# Patient Record
Sex: FEMALE | Race: BLACK OR AFRICAN AMERICAN | NOT HISPANIC OR LATINO | Employment: OTHER | URBAN - METROPOLITAN AREA
[De-identification: names, ages, dates, MRNs, and addresses within clinical notes are randomized per-mention and may not be internally consistent; named-entity substitution may affect disease eponyms.]

---

## 2023-07-12 ENCOUNTER — OFFICE VISIT (OUTPATIENT)
Dept: PRIMARY CARE | Facility: CLINIC | Age: 78
End: 2023-07-12
Payer: MEDICARE

## 2023-07-12 VITALS
SYSTOLIC BLOOD PRESSURE: 128 MMHG | DIASTOLIC BLOOD PRESSURE: 78 MMHG | HEART RATE: 79 BPM | HEIGHT: 65 IN | WEIGHT: 116 LBS | OXYGEN SATURATION: 97 % | BODY MASS INDEX: 19.33 KG/M2

## 2023-07-12 DIAGNOSIS — Z00.00 MEDICARE ANNUAL WELLNESS VISIT, SUBSEQUENT: Primary | ICD-10-CM

## 2023-07-12 DIAGNOSIS — R73.01 ELEVATED FASTING GLUCOSE: ICD-10-CM

## 2023-07-12 DIAGNOSIS — E78.2 MODERATE MIXED HYPERLIPIDEMIA NOT REQUIRING STATIN THERAPY: ICD-10-CM

## 2023-07-12 DIAGNOSIS — Z11.59 NEED FOR HEPATITIS C SCREENING TEST: ICD-10-CM

## 2023-07-12 DIAGNOSIS — E55.9 VITAMIN D DEFICIENCY: ICD-10-CM

## 2023-07-12 DIAGNOSIS — R94.6 ABNORMAL THYROID EXAM: ICD-10-CM

## 2023-07-12 DIAGNOSIS — I42.2 OTHER HYPERTROPHIC CARDIOMYOPATHY (MULTI): ICD-10-CM

## 2023-07-12 DIAGNOSIS — I10 PRIMARY HYPERTENSION: ICD-10-CM

## 2023-07-12 DIAGNOSIS — Z12.31 ENCOUNTER FOR SCREENING MAMMOGRAM FOR BREAST CANCER: ICD-10-CM

## 2023-07-12 DIAGNOSIS — Z23 ENCOUNTER FOR IMMUNIZATION: ICD-10-CM

## 2023-07-12 PROBLEM — M25.661 STIFFNESS OF RIGHT KNEE, NOT ELSEWHERE CLASSIFIED: Status: ACTIVE | Noted: 2023-07-12

## 2023-07-12 PROBLEM — H17.9 CORNEAL SCAR: Status: ACTIVE | Noted: 2023-07-12

## 2023-07-12 PROBLEM — H90.0 CONDUCTIVE HEARING LOSS OF BOTH EARS: Status: ACTIVE | Noted: 2023-07-12

## 2023-07-12 PROBLEM — E78.5 HYPERLIPIDEMIA: Status: ACTIVE | Noted: 2023-07-12

## 2023-07-12 PROBLEM — D86.9 SARCOIDOSIS: Status: ACTIVE | Noted: 2023-07-12

## 2023-07-12 PROBLEM — D11.0 PAROTID PLEOMORPHIC ADENOMA: Status: ACTIVE | Noted: 2023-07-12

## 2023-07-12 PROBLEM — H04.129 DRY EYE SYNDROME: Status: ACTIVE | Noted: 2023-07-12

## 2023-07-12 PROCEDURE — 99397 PER PM REEVAL EST PAT 65+ YR: CPT | Performed by: FAMILY MEDICINE

## 2023-07-12 PROCEDURE — 1036F TOBACCO NON-USER: CPT | Performed by: FAMILY MEDICINE

## 2023-07-12 PROCEDURE — 1159F MED LIST DOCD IN RCRD: CPT | Performed by: FAMILY MEDICINE

## 2023-07-12 PROCEDURE — 3074F SYST BP LT 130 MM HG: CPT | Performed by: FAMILY MEDICINE

## 2023-07-12 PROCEDURE — 99213 OFFICE O/P EST LOW 20 MIN: CPT | Performed by: FAMILY MEDICINE

## 2023-07-12 PROCEDURE — 1160F RVW MEDS BY RX/DR IN RCRD: CPT | Performed by: FAMILY MEDICINE

## 2023-07-12 PROCEDURE — G0439 PPPS, SUBSEQ VISIT: HCPCS | Performed by: FAMILY MEDICINE

## 2023-07-12 PROCEDURE — 3078F DIAST BP <80 MM HG: CPT | Performed by: FAMILY MEDICINE

## 2023-07-12 PROCEDURE — 1170F FXNL STATUS ASSESSED: CPT | Performed by: FAMILY MEDICINE

## 2023-07-12 RX ORDER — LISINOPRIL 40 MG/1
40 TABLET ORAL DAILY
COMMUNITY
End: 2023-07-12 | Stop reason: SDUPTHER

## 2023-07-12 RX ORDER — ACETAMINOPHEN 325 MG/1
TABLET ORAL
COMMUNITY

## 2023-07-12 RX ORDER — DOXAZOSIN 2 MG/1
2 TABLET ORAL NIGHTLY
Qty: 90 TABLET | Refills: 1 | Status: SHIPPED | OUTPATIENT
Start: 2023-07-12 | End: 2024-01-31 | Stop reason: SDUPTHER

## 2023-07-12 RX ORDER — DOXAZOSIN 2 MG/1
2 TABLET ORAL NIGHTLY
COMMUNITY
End: 2023-07-12 | Stop reason: SDUPTHER

## 2023-07-12 RX ORDER — ASCORBIC ACID 125 MG
TABLET,CHEWABLE ORAL
COMMUNITY
Start: 2020-08-27

## 2023-07-12 RX ORDER — FLAXSEED OIL 1000 MG
1000 CAPSULE ORAL DAILY
COMMUNITY

## 2023-07-12 RX ORDER — LISINOPRIL 40 MG/1
40 TABLET ORAL DAILY
Qty: 90 TABLET | Refills: 1 | Status: SHIPPED | OUTPATIENT
Start: 2023-07-12 | End: 2024-01-31 | Stop reason: SDUPTHER

## 2023-07-12 ASSESSMENT — ENCOUNTER SYMPTOMS
DEPRESSION: 0
LOSS OF SENSATION IN FEET: 0
OCCASIONAL FEELINGS OF UNSTEADINESS: 0

## 2023-07-12 ASSESSMENT — ACTIVITIES OF DAILY LIVING (ADL)
ADEQUATE_TO_COMPLETE_ADL: YES
GROCERY SHOPPING: INDEPENDENT
DOING HOUSEWORK: INDEPENDENT
TOILETING: INDEPENDENT
DRESSING: INDEPENDENT
JUDGMENT_ADEQUATE_SAFELY_COMPLETE_DAILY_ACTIVITIES: YES
MANAGING FINANCES: INDEPENDENT
PREPARING MEALS: INDEPENDENT
NEEDS ASSISTANCE WITH FOOD: INDEPENDENT
PILL BOX USED: YES
FEEDING: INDEPENDENT
USING TELEPHONE: INDEPENDENT
USING TRANSPORTATION: INDEPENDENT
STIL DRIVING: YES
TAKING MEDICATION: INDEPENDENT
EATING: INDEPENDENT
BATHING: INDEPENDENT

## 2023-07-12 ASSESSMENT — COLUMBIA-SUICIDE SEVERITY RATING SCALE - C-SSRS
1. IN THE PAST MONTH, HAVE YOU WISHED YOU WERE DEAD OR WISHED YOU COULD GO TO SLEEP AND NOT WAKE UP?: NO
6. HAVE YOU EVER DONE ANYTHING, STARTED TO DO ANYTHING, OR PREPARED TO DO ANYTHING TO END YOUR LIFE?: NO
2. HAVE YOU ACTUALLY HAD ANY THOUGHTS OF KILLING YOURSELF?: NO

## 2023-07-12 ASSESSMENT — PATIENT HEALTH QUESTIONNAIRE - PHQ9
2. FEELING DOWN, DEPRESSED OR HOPELESS: NOT AT ALL
1. LITTLE INTEREST OR PLEASURE IN DOING THINGS: NOT AT ALL
SUM OF ALL RESPONSES TO PHQ9 QUESTIONS 1 AND 2: 0

## 2023-07-12 NOTE — PROGRESS NOTES
"Subjective   Patient ID: Liudmila Bal is a 77 y.o. female who presents for Medicare Annual Wellness Visit Subsequent.    HPI   The patient reports that she is taking lisinopril and doxazosin for her hypertension as prescribed and is tolerating it well. She is still experiencing knee pain and has gotten rheumatological work-up done which showed no concerns. She is currently treating it with over-the-counter diclofenac gel and this alleviates her pain. Her sarcoidosis and cardiomyopathy are both stable at this time.     Review of Systems  Constitutional: No fever or chills, No Night Sweats  Eyes: No Blurry Vision or Eye sight problems  ENT: No Nasal Discharge, Hoarseness, sore throat  Cardiovascular: no chest pain, no palpitations and no syncope.   Respiratory: no cough, no shortness of breath during exertion and no shortness of breath at rest.   Gastrointestinal: no abdominal pain, no nausea and no vomiting.   : No vaginal discharge, burning with urination, no blood in urine or stools  Skin: No Skin rashes or Lesions  Neuro: No Headache, no dizziness or Numbness or tingling  Psych: No Anxiety, depression or sleeping problems  Heme: No Easy bleeding or brusing.     Objective   /78   Pulse 79   Ht 1.651 m (5' 5\")   Wt 52.6 kg (116 lb)   SpO2 97%   BMI 19.30 kg/m²     Physical Exam  Patient declined Chaperone  Constitutional: Alert and in no acute distress. Well developed, well nourished.   Head and Face: Head and face: Normal.    Eyes: Normal external exam. Pupils were equal in size, round, reactive to light (PERRL) with normal accommodation and extraocular movements intact (EOMI).   Ears, Nose, Mouth, and Throat: External inspection of ears and nose: Normal.  Hearing: Normal.  Nasal mucosa, septum, and turbinates: Normal.  Lips, teeth, and gums: Normal.  Oropharynx: Normal.   Neck: No neck mass was observed. Supple. Thyroid not enlarged and there were no palpable thyroid nodules.   Cardiovascular: " Heart rate and rhythm were normal, normal S1 and S2. Pedal pulses: Normal. No peripheral edema.   Pulmonary: No respiratory distress. Clear bilateral breath sounds.   Breast: Left breast normal, no breast tissue on right breast.   Abdomen: Soft nontender; no abdominal mass palpated. Normal bowel sounds. No organomegaly.   Musculoskeletal: No joint swelling seen, normal movements of all extremities. Range of motion: Normal.  Muscle strength/tone: Normal.    Skin: Normal skin color and pigmentation, normal skin turgor, and no rash.   Neurologic: Deep tendon reflexes were 2+ and symmetric.   Psychiatric: Judgment and insight: Intact. Mood and affect: Normal.  Lymphatic: No cervical lymphadenopathy. Palpation of lymph nodes in axillae: Normal.  Palpation of lymph nodes in groin: Normal.    Lab Results   Component Value Date    WBC 4.6 06/24/2022    HGB 11.7 (L) 06/24/2022    HCT 38.6 06/24/2022     06/24/2022    CHOL 205 (H) 06/24/2022    TRIG 65 06/24/2022    HDL 68.3 06/24/2022    ALT 13 06/24/2022    AST 18 06/24/2022     (H) 02/15/2023    K 4.2 02/15/2023     (H) 02/15/2023    CREATININE 1.03 02/15/2023    BUN 14 02/15/2023    CO2 28 02/15/2023    TSH 1.23 06/24/2022    INR 1.1 11/13/2017    HGBA1C 5.7 (A) 06/24/2022       BI mammo bilateral screening tomosynthesis  Narrative: Interpreted By:  CARY JOHNSON MD  MRN: [id number]  Patient Name: DIONI ALBERTO     STUDY:  DIGITAL MAMM SCREENING W/ NANCY;  5/17/2023 8:25 am     ACCESSION NUMBER(S):  [id number]     ORDERING CLINICIAN:  ERWIN IRWIN     INDICATION:  Screening. Right mastectomy.     COMPARISON:  05/16/2022 and 06/04/2021.     FINDINGS:  2D and tomosynthesis images were reviewed at 1 mm slice thickness.  Best images possible.     The breast tissue is extremely dense, which may limit the sensitivity  of mammography.   No suspicious masses or calcifications are  identified.     Impression: No mammographic evidence of malignancy.     BI-RADS  CATEGORY:     Category: 1 - Negative.  Recommendation: 1 Year Screening.     For any future breast imaging appointments, please call [phone]  [phone]).     Patient letter sent SNORM            Assessment/Plan   Diagnoses and all orders for this visit:  Medicare annual wellness visit, subsequent  Encounter for immunization  -     diphth,pertus,acell,,tetanus (BoostRIX) 2.5-8-5 Lf-mcg-Lf/0.5mL injection; Inject 0.5 mL into the shoulder, thigh, or buttocks 1 time for 1 dose.  -     zoster vaccine-recombinant adjuvanted (Shingrix) 50 mcg/0.5 mL vaccine; Inject 0.5 mL into the shoulder, thigh, or buttocks every 8 (eight) weeks.  Primary hypertension  -     Albumin , Urine Random; Future  -     CBC; Future  -     Comprehensive Metabolic Panel; Future  -     lisinopril 40 mg tablet; Take 1 tablet (40 mg) by mouth once daily. for blood pressure  -     doxazosin (Cardura) 2 mg tablet; Take 1 tablet (2 mg) by mouth once daily at bedtime.  -     Follow Up In [facility] - PCP - Established; Future  Moderate mixed hyperlipidemia not requiring statin therapy  -     Lipid Panel; Future  Elevated fasting glucose  -     Hemoglobin A1C; Future  Vitamin D deficiency  -     Vitamin B12; Future  -     Vitamin D, Total; Future  Encounter for screening mammogram for breast cancer  -     BI mammo bilateral screening tomosynthesis; Future  Need for hepatitis C screening test  -     Hepatitis C antibody; Future  Abnormal thyroid exam  -     TSH with reflex to Free T4 if abnormal; Future  Other hypertrophic cardiomyopathy (CMS/HCC)        Dear Liudmila Bal     It was my pleasure to take care of you today in the office. Below are the things we discussed today:    1. 1. Immunizations: Yearly Flu shot is recommended.          a: COVID: Up-to-Date         b: Tetanus: Please get from the pharmacy         c: Shingrix: Please get from the pharmacy          d: Pneumovax: Up-to-date         e: Prevnar: Up-to-date    2. Blood Work:  Ordered   3. Seen your dentist twice a year  4. Yearly Eye exam is recommended    5. BMI: Normal   6: Diet recommendations:   Eat Clean, Try to have as many home cooked meals as possible  Avoid processed foods which contain excess calories, sugar, and sodium.    7. Exercise recommendations:   150 minutes a week to maintain your weight     If you have to loose weight, you need a better diet and exercise plan.     8. Supplements recommended:  a - Calcium 600 mg up to twice a day to get a total of 1200 mg. Each 8 oz of milk or yogurt or 1 oz of cheese, 1 Banana, 1 serving of green Leafy vegetable has about 300 mg of Calcium, so you may subtract that amount. Calcium citrate is the only acceptable supplement to take if you take an acid suppressing medication like Prilosec; otherwise Calcium carbonate is acceptable too (It can cause Constipation).   b - Vitamin D - 2000 IU daily     9. Please get your Living will / Advance directive completed if you do not have one already. Please make sure our office has a copy of the latest one.     10. Colonoscopy: Uptodate, Last done in July 2022, repeat in July 2025  11. Mammogram: Cologuard Uptodate, ordered for May 2024   12. PAP: Not indicated   13. DEXA: Up-to-date   14: Skin Check: Please see Dermatology once a year for a Skin Check.     15. Hypertension: Continue Lisinopril and doxazosin.    16. Knee pain: Use over-the-counter diclofenac gel.    17. Sarcoidosis: Stable.     18. Cardiomyopathy: Stable.    Follow up in 6 months.    Follow up in one year for a Physical. Please call the office before your Physical to see if you need blood work completed prior to your physical.     Please call me if any questions arise from now until your next visit. I will call you after I am done seeing patients. A Doctor is always available by phone when the office is closed. Please feel free to call for help with any problem that you feel shouldn't wait until the office re-opens.     Scribe  Attestation  By signing my name below, I, Rudolph Mendoza   attest that this documentation has been prepared under the direction and in the presence of Elvi Granados MD.

## 2023-07-20 LAB
ALANINE AMINOTRANSFERASE (SGPT) (U/L) IN SER/PLAS: 12 U/L (ref 7–45)
ALBUMIN (G/DL) IN SER/PLAS: 4.5 G/DL (ref 3.4–5)
ALBUMIN (MG/L) IN URINE: 12.6 MG/L
ALBUMIN/CREATININE (UG/MG) IN URINE: 17.2 UG/MG CRT (ref 0–30)
ALKALINE PHOSPHATASE (U/L) IN SER/PLAS: 75 U/L (ref 33–136)
ANION GAP IN SER/PLAS: 13 MMOL/L (ref 10–20)
ASPARTATE AMINOTRANSFERASE (SGOT) (U/L) IN SER/PLAS: 21 U/L (ref 9–39)
BILIRUBIN TOTAL (MG/DL) IN SER/PLAS: 0.6 MG/DL (ref 0–1.2)
CALCIDIOL (25 OH VITAMIN D3) (NG/ML) IN SER/PLAS: 53 NG/ML
CALCIUM (MG/DL) IN SER/PLAS: 11 MG/DL (ref 8.6–10.6)
CARBON DIOXIDE, TOTAL (MMOL/L) IN SER/PLAS: 31 MMOL/L (ref 21–32)
CHLORIDE (MMOL/L) IN SER/PLAS: 107 MMOL/L (ref 98–107)
CHOLESTEROL (MG/DL) IN SER/PLAS: 204 MG/DL (ref 0–199)
CHOLESTEROL IN HDL (MG/DL) IN SER/PLAS: 77.6 MG/DL
CHOLESTEROL/HDL RATIO: 2.6
COBALAMIN (VITAMIN B12) (PG/ML) IN SER/PLAS: 368 PG/ML (ref 211–911)
CREATININE (MG/DL) IN SER/PLAS: 0.88 MG/DL (ref 0.5–1.05)
CREATININE (MG/DL) IN URINE: 73.1 MG/DL (ref 20–320)
ERYTHROCYTE DISTRIBUTION WIDTH (RATIO) BY AUTOMATED COUNT: 14.1 % (ref 11.5–14.5)
ERYTHROCYTE MEAN CORPUSCULAR HEMOGLOBIN CONCENTRATION (G/DL) BY AUTOMATED: 30.8 G/DL (ref 32–36)
ERYTHROCYTE MEAN CORPUSCULAR VOLUME (FL) BY AUTOMATED COUNT: 92 FL (ref 80–100)
ERYTHROCYTES (10*6/UL) IN BLOOD BY AUTOMATED COUNT: 4.31 X10E12/L (ref 4–5.2)
ESTIMATED AVERAGE GLUCOSE FOR HBA1C: 117 MG/DL
GFR FEMALE: 66 ML/MIN/1.73M2
GLUCOSE (MG/DL) IN SER/PLAS: 97 MG/DL (ref 74–99)
HEMATOCRIT (%) IN BLOOD BY AUTOMATED COUNT: 39.6 % (ref 36–46)
HEMOGLOBIN (G/DL) IN BLOOD: 12.2 G/DL (ref 12–16)
HEMOGLOBIN A1C/HEMOGLOBIN TOTAL IN BLOOD: 5.7 %
HEPATITIS C VIRUS AB PRESENCE IN SERUM: NONREACTIVE
LDL: 110 MG/DL (ref 0–99)
LEUKOCYTES (10*3/UL) IN BLOOD BY AUTOMATED COUNT: 5.7 X10E9/L (ref 4.4–11.3)
NRBC (PER 100 WBCS) BY AUTOMATED COUNT: 0 /100 WBC (ref 0–0)
PLATELETS (10*3/UL) IN BLOOD AUTOMATED COUNT: 325 X10E9/L (ref 150–450)
POTASSIUM (MMOL/L) IN SER/PLAS: 4.2 MMOL/L (ref 3.5–5.3)
PROTEIN TOTAL: 6.8 G/DL (ref 6.4–8.2)
SODIUM (MMOL/L) IN SER/PLAS: 147 MMOL/L (ref 136–145)
THYROTROPIN (MIU/L) IN SER/PLAS BY DETECTION LIMIT <= 0.05 MIU/L: 0.71 MIU/L (ref 0.44–3.98)
TRIGLYCERIDE (MG/DL) IN SER/PLAS: 80 MG/DL (ref 0–149)
UREA NITROGEN (MG/DL) IN SER/PLAS: 13 MG/DL (ref 6–23)
VLDL: 16 MG/DL (ref 0–40)

## 2023-07-20 PROCEDURE — 82306 VITAMIN D 25 HYDROXY: CPT

## 2023-07-20 PROCEDURE — 83036 HEMOGLOBIN GLYCOSYLATED A1C: CPT

## 2023-07-20 PROCEDURE — 84443 ASSAY THYROID STIM HORMONE: CPT

## 2023-07-20 PROCEDURE — 80061 LIPID PANEL: CPT

## 2023-07-20 PROCEDURE — 86803 HEPATITIS C AB TEST: CPT

## 2023-07-20 PROCEDURE — 82607 VITAMIN B-12: CPT

## 2023-07-20 PROCEDURE — 82570 ASSAY OF URINE CREATININE: CPT

## 2023-07-20 PROCEDURE — 80053 COMPREHEN METABOLIC PANEL: CPT

## 2023-07-20 PROCEDURE — 82043 UR ALBUMIN QUANTITATIVE: CPT

## 2023-07-20 PROCEDURE — 85027 COMPLETE CBC AUTOMATED: CPT

## 2023-08-07 LAB
ALBUMIN (G/DL) IN SER/PLAS: 4.5 G/DL (ref 3.4–5)
ANION GAP IN SER/PLAS: 11 MMOL/L (ref 10–20)
CALCIDIOL (25 OH VITAMIN D3) (NG/ML) IN SER/PLAS: 50 NG/ML
CALCIUM (MG/DL) IN SER/PLAS: 11.1 MG/DL (ref 8.6–10.6)
CARBON DIOXIDE, TOTAL (MMOL/L) IN SER/PLAS: 31 MMOL/L (ref 21–32)
CHLORIDE (MMOL/L) IN SER/PLAS: 107 MMOL/L (ref 98–107)
CREATININE (MG/DL) IN SER/PLAS: 0.94 MG/DL (ref 0.5–1.05)
GFR FEMALE: 61 ML/MIN/1.73M2
GLUCOSE (MG/DL) IN SER/PLAS: 109 MG/DL (ref 74–99)
PARATHYRIN INTACT (PG/ML) IN SER/PLAS: 68.2 PG/ML (ref 18.5–88)
PHOSPHATE (MG/DL) IN SER/PLAS: 2.5 MG/DL (ref 2.5–4.9)
POTASSIUM (MMOL/L) IN SER/PLAS: 4.1 MMOL/L (ref 3.5–5.3)
SODIUM (MMOL/L) IN SER/PLAS: 145 MMOL/L (ref 136–145)
UREA NITROGEN (MG/DL) IN SER/PLAS: 13 MG/DL (ref 6–23)

## 2023-10-22 PROBLEM — D49.0 TUMOR OF PAROTID GLAND: Status: ACTIVE | Noted: 2023-10-22

## 2023-10-22 PROBLEM — N95.8 OTHER SPECIFIED MENOPAUSAL AND PERIMENOPAUSAL DISORDERS: Status: ACTIVE | Noted: 2023-10-22

## 2023-10-22 PROBLEM — H90.A31 MIXED CONDUCTIVE AND SENSORINEURAL HEARING LOSS, UNILATERAL, RIGHT EAR WITH RESTRICTED HEARING ON THE CONTRALATERAL SIDE: Status: ACTIVE | Noted: 2023-10-22

## 2023-10-22 PROBLEM — H90.A22 SENSORINEURAL HEARING LOSS (SNHL) OF LEFT EAR WITH RESTRICTED HEARING OF RIGHT EAR: Status: ACTIVE | Noted: 2023-10-22

## 2023-10-22 PROBLEM — H93.13 TINNITUS, BILATERAL: Status: ACTIVE | Noted: 2023-10-22

## 2023-10-22 PROBLEM — M85.80 OSTEOPENIA: Status: ACTIVE | Noted: 2023-10-22

## 2023-10-22 PROBLEM — H72.03 CENTRAL PERFORATION OF TYMPANIC MEMBRANE OF BOTH EARS: Status: ACTIVE | Noted: 2023-10-22

## 2023-10-22 PROBLEM — E83.52 HYPERCALCEMIA: Status: ACTIVE | Noted: 2023-10-22

## 2023-10-22 PROBLEM — K40.90 LEFT INGUINAL HERNIA: Status: ACTIVE | Noted: 2023-10-22

## 2023-10-22 PROBLEM — H91.90 HEARING LOSS: Status: ACTIVE | Noted: 2023-10-22

## 2023-10-24 ENCOUNTER — OFFICE VISIT (OUTPATIENT)
Dept: OTOLARYNGOLOGY | Facility: CLINIC | Age: 78
End: 2023-10-24
Payer: MEDICARE

## 2023-10-24 VITALS — BODY MASS INDEX: 20.83 KG/M2 | HEIGHT: 65 IN | WEIGHT: 125 LBS

## 2023-10-24 DIAGNOSIS — H93.13 TINNITUS, BILATERAL: ICD-10-CM

## 2023-10-24 DIAGNOSIS — H72.92 PERFORATION OF LEFT TYMPANIC MEMBRANE: ICD-10-CM

## 2023-10-24 DIAGNOSIS — H90.A31 MIXED CONDUCTIVE AND SENSORINEURAL HEARING LOSS, UNILATERAL, RIGHT EAR WITH RESTRICTED HEARING ON THE CONTRALATERAL SIDE: ICD-10-CM

## 2023-10-24 DIAGNOSIS — H65.32 CHRONIC MUCOID OTITIS MEDIA OF LEFT EAR: ICD-10-CM

## 2023-10-24 DIAGNOSIS — H90.0 CONDUCTIVE HEARING LOSS OF BOTH EARS: Primary | ICD-10-CM

## 2023-10-24 DIAGNOSIS — H90.A22 SENSORINEURAL HEARING LOSS (SNHL) OF LEFT EAR WITH RESTRICTED HEARING OF RIGHT EAR: ICD-10-CM

## 2023-10-24 PROCEDURE — 1159F MED LIST DOCD IN RCRD: CPT | Performed by: OTOLARYNGOLOGY

## 2023-10-24 PROCEDURE — 1036F TOBACCO NON-USER: CPT | Performed by: OTOLARYNGOLOGY

## 2023-10-24 PROCEDURE — 99213 OFFICE O/P EST LOW 20 MIN: CPT | Performed by: OTOLARYNGOLOGY

## 2023-10-24 PROCEDURE — 1160F RVW MEDS BY RX/DR IN RCRD: CPT | Performed by: OTOLARYNGOLOGY

## 2023-10-24 PROCEDURE — 92504 EAR MICROSCOPY EXAMINATION: CPT | Performed by: OTOLARYNGOLOGY

## 2023-10-24 RX ORDER — OFLOXACIN 3 MG/ML
4 SOLUTION AURICULAR (OTIC) 2 TIMES WEEKLY
Qty: 5 ML | Refills: 1 | Status: SHIPPED | OUTPATIENT
Start: 2023-10-26 | End: 2023-11-05

## 2023-10-24 NOTE — LETTER
October 24, 2023     Elvi Granados MD  [address]  [address]  [facility] [address]    Patient: Liudmila Bal   YOB: 1945   Date of Visit: 10/24/2023       Dear Dr. Elvi Granados MD:    I had the pleasure of seeing your patient, Liudmila Bal today. Below are my notes for this consultation.  If you have questions, please do not hesitate to call me. Thank you for allowing me to participate in the care of your patient.        Sincerely,     Kerri Glez MD      CC: No Recipients  _____________________________________________________________________________    78 y.o. 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 Overall she's doing well, she denies any ear drainage or pain, she does feel her hearing on the right is subjectively down and she continues to have constant right-sided tinnitus on that side. She's having aural fullness on the left, she attributes it to cerumen.     - Offered audiogram, she deferred, can contact office at any time for this order   - Advised to use ear drops in left ear 2x weekly for cerumen, prescription sent today   - RTC in 3 months

## 2023-10-24 NOTE — PATIENT INSTRUCTIONS
Welcome to Dr. Glez's clinic. We are here to assist you through your ENT care at [facility].  Dr. Glez is an Ear surgeon. This means that she specializes in taking care of patients with complex ear problems.     Dr. Glez's office number is [phone]. While you may see her at a satellite office, she has a team committed to help meet your healthcare needs at [facility]'s [facility]. This number is the most direct way to communicate with the office.     Mary is Dr. Glez's  and she answers the office phone from 8am-4pm Mon-Fri. She can help you with many general questions and information. Questions that she cannot answer will be directed to the appropriate staff. You may need to leave a message. In this case, someone from the team will call you back.     Hector is Dr. Glez's primary nurse and can be reached by calling the office. Hector is in clinic with Dr. Glez's on Mondays and Tuesdays. Non-urgent calls will be returned on non-clinic days typically Thursdays.     Sometimes, other team members will also be involved in your care. These people may include dieticians, social workers, speech therapists, audiologist, neurologist, and physical therapist. Dr. Glez will provide these referrals as needed. Please let her know if you would like to request a specific referral.     For your convenience, Dr. Glez sees patients at several [facility] locations including [facility] and [facility] at the main campus of [facility]. While we try to make your appointments as convenient as possible, occasionally a visit to another location may be necessary to provide the best care for you. We look forward to working with you to meet your healthcare goals.     Dr. Glez makes every effort to run on time for your appointments. Therefore, if you are more than 30 minutes late unrelated to a scan or another appointment such therapy or audio you will have to reschedule.

## 2023-10-24 NOTE — PROGRESS NOTES
History Of Present Illness:  Liudmila Bal is a 78 y.o. 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 She's doing well overall, she denies any ear drainage or pain. She does feel her hearing on the right is subjectively down. She continues to have constant right-sided tinnitus. She's having aural fullness on the left, she attributes it to cerumen.     Recall 4/25/23:   Liudmila is a 76 yo female w/ a hx of mixed conductive and sensorineural hearing loss and bilateral TM perforations, she is s/p R CWU tympanomastoidectomy on 11/20/17. She continues to have constant right-sided tinnitus. She's recently had some fullness in her left ear, she denies any drainage from that side.     Surgical History:  She has a past surgical history that includes Lymph node biopsy (07/06/2012); Tubal ligation (07/06/2012); Lymph node biopsy (07/06/2012); Other surgical history (07/06/2012); Cervical laminectomy (07/06/2012); Other surgical history (07/06/2012); [place] lymph node biopsy (03/18/2014); Other surgical history (08/05/2019); Other surgical history (04/16/2014); Other surgical history (04/16/2014); Other surgical history (06/10/2014); Other surgical history (05/14/2019); and Cataract extraction (04/01/2013).     Allergies:  Simvastatin, Tamoxifen, and Fluticasone    Medications:   Current Outpatient Medications   Medication Instructions    acetaminophen (Tylenol) 325 mg tablet oral    cholecalciferol, vitamin D3, 25 mcg (1,000 unit) tablet,chewable oral    doxazosin (CARDURA) 2 mg, oral, Nightly    flaxseed oiL 1,000 mg, oral, Daily    lisinopril 40 mg, oral, Daily, for blood pressure    white petrolatum-mineral oiL 94-3 % ophthalmic ointment ophthalmic (eye)    zoster vaccine-recombinant adjuvanted (Shingrix) 50 mcg/0.5 mL vaccine 0.5 mL, intramuscular, Every 8 weeks      Review of Systems:   A comprehensive  10-point review of systems was obtained including constitutional, neurological, HEENT, pulmonary, cardiovascular, genito-urinary, and other pertinent systems and was negative except as noted in the HPI.      Physical Exam:  Constitutional   General appearance: Healthy-appearing, well-nourished, well groomed, in no acute distress.   Ability to communicate: Normal communication without aids, normal voice quality.       Head and face: Atraumatic with no masses, lesions, or scarring.   Facial strength: Normal strength and symmetry, no synkinesis or facial tic.     Ears  Otoscopic examination:   Right: TM intact, mild retraction, effusion on that side  Left: 40% perforation present with thick gluey mucoid effusion, mucosa healthy      Nose: Dorsum symmetric with no visible or palpable deformities.     Oral Cavity/Mouth  Lips, teeth, and gums: Normal lips, gums, and dentition.     Oropharynx: Mucosa moist, no lesions.     Neck: Symmetrical, trachea midline. No masses visible.     Neurological/Psychiatric  Cranial Nerve Examination: II - XII grossly intact.  Orientation to person, place, and time: Normal.  Mood and affect: Normal.     Skin: Normal without rashes or lesions.     Pulmonary  Respiratory effort: Chest expands symmetrically.     Cardiovascular: Good peripheral pulses  Peripheral vascular system: No varicosities, carotid pulse normal, no edema. No jugular venous distension.     Extremities: Appearance of extremities: Normal. Gait normal.     Last Recorded Vitals:  There were no vitals taken for this visit.    Assessment/Plan   78 y.o. female with a history of mixed conductive and sensorineural hearing loss and bilateral TM perforations, she is s/p R U tympanomastoidectomy on 11/20/17. Overall she's doing well, she denies any ear drainage or pain, she does feel her hearing on the right is subjectively down and she continues to have constant right-sided tinnitus on that side. She's having aural fullness on the  left, she attributes it to cerumen.     - Offered audiogram, she deferred, can contact office at any time for this order   - Advised to use ear drops in left ear 2x weekly for cerumen, prescription sent today   - RTC in 3 months      Scribe Attestation:  By signing my name below, I, Ashley Charisse , Scribuche   attest that this documentation has been prepared under the direction and in the presence of Kerri Glez MD.    I have reviewed the documentation as scribed by Ashley Mcqueen and agree with the notes.   Kerri Glez MD

## 2024-01-17 ENCOUNTER — APPOINTMENT (OUTPATIENT)
Dept: PRIMARY CARE | Facility: CLINIC | Age: 79
End: 2024-01-17
Payer: MEDICARE

## 2024-01-30 ENCOUNTER — OFFICE VISIT (OUTPATIENT)
Dept: OTOLARYNGOLOGY | Facility: CLINIC | Age: 79
End: 2024-01-30
Payer: MEDICARE

## 2024-01-30 VITALS — WEIGHT: 125 LBS | BODY MASS INDEX: 20.83 KG/M2 | HEIGHT: 65 IN

## 2024-01-30 DIAGNOSIS — H93.8X2 EAR FULLNESS, LEFT: ICD-10-CM

## 2024-01-30 DIAGNOSIS — H93.13 TINNITUS, BILATERAL: ICD-10-CM

## 2024-01-30 DIAGNOSIS — H90.A22 SENSORINEURAL HEARING LOSS (SNHL) OF LEFT EAR WITH RESTRICTED HEARING OF RIGHT EAR: ICD-10-CM

## 2024-01-30 DIAGNOSIS — H72.92 PERFORATION OF LEFT TYMPANIC MEMBRANE: ICD-10-CM

## 2024-01-30 DIAGNOSIS — H90.A31 MIXED CONDUCTIVE AND SENSORINEURAL HEARING LOSS, UNILATERAL, RIGHT EAR WITH RESTRICTED HEARING ON THE CONTRALATERAL SIDE: Primary | ICD-10-CM

## 2024-01-30 PROCEDURE — 1036F TOBACCO NON-USER: CPT | Performed by: OTOLARYNGOLOGY

## 2024-01-30 PROCEDURE — 99213 OFFICE O/P EST LOW 20 MIN: CPT | Performed by: OTOLARYNGOLOGY

## 2024-01-30 PROCEDURE — 1160F RVW MEDS BY RX/DR IN RCRD: CPT | Performed by: OTOLARYNGOLOGY

## 2024-01-30 PROCEDURE — 92504 EAR MICROSCOPY EXAMINATION: CPT | Performed by: OTOLARYNGOLOGY

## 2024-01-30 PROCEDURE — 1159F MED LIST DOCD IN RCRD: CPT | Performed by: OTOLARYNGOLOGY

## 2024-01-30 ASSESSMENT — PATIENT HEALTH QUESTIONNAIRE - PHQ9
SUM OF ALL RESPONSES TO PHQ9 QUESTIONS 1 AND 2: 0
1. LITTLE INTEREST OR PLEASURE IN DOING THINGS: NOT AT ALL
2. FEELING DOWN, DEPRESSED OR HOPELESS: NOT AT ALL

## 2024-01-30 NOTE — PROGRESS NOTES
History Of Present Illness:  Liudmila Bal is a 78 y.o. 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 She's having left-sided ear fullness, denies any pain or drainage on that side. She's used drops in the past but she's unsure if they were helpful.     Recall 10/24/23:  Liudmila Bal is a 78 y.o. 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 She's doing well overall, she denies any ear drainage or pain. She does feel her hearing on the right is subjectively down. She continues to have constant right-sided tinnitus. She's having aural fullness on the left, she attributes it to cerumen.      Surgical History:  She has a past surgical history that includes Lymph node biopsy (07/06/2012); Tubal ligation (07/06/2012); Lymph node biopsy (07/06/2012); Other surgical history (07/06/2012); Cervical laminectomy (07/06/2012); Other surgical history (07/06/2012); [place] lymph node biopsy (03/18/2014); Other surgical history (08/05/2019); Other surgical history (04/16/2014); Other surgical history (04/16/2014); Other surgical history (06/10/2014); Other surgical history (05/14/2019); and Cataract extraction (04/01/2013).     Allergies:  Simvastatin, Tamoxifen, and Fluticasone    Medications:   Current Outpatient Medications   Medication Instructions    acetaminophen (Tylenol) 325 mg tablet oral    cholecalciferol, vitamin D3, 25 mcg (1,000 unit) tablet,chewable oral    doxazosin (CARDURA) 2 mg, oral, Nightly    flaxseed oiL 1,000 mg, oral, Daily    lisinopril 40 mg, oral, Daily, for blood pressure    white petrolatum-mineral oiL 94-3 % ophthalmic ointment ophthalmic (eye)    zoster vaccine-recombinant adjuvanted (Shingrix) 50 mcg/0.5 mL vaccine 0.5 mL, intramuscular, Every 8 weeks      Review of  "Systems:   A comprehensive 10-point review of systems was obtained including constitutional, neurological, HEENT, pulmonary, cardiovascular, genito-urinary, and other pertinent systems and was negative except as noted in the HPI.      Physical Exam:  Constitutional   General appearance: Healthy-appearing, well-nourished, well groomed, in no acute distress.   Ability to communicate: Normal communication without aids, normal voice quality.       Head and face: Atraumatic with no masses, lesions, or scarring.   Facial strength: Normal strength and symmetry, no synkinesis or facial tic.     Ears  Otoscopic examination:   Right: Ear canal clear, TM normal, no effusion or retraction  Left: TM perforation ~40-50%, thick glue ear mucous suctioned through perforation, middle ear mucosa does not appear particularly inflamed or edematous      Nose: Dorsum symmetric with no visible or palpable deformities.     Oral Cavity/Mouth  Lips, teeth, and gums: Normal lips, gums, and dentition.     Oropharynx: Mucosa moist, no lesions.     Neck: Symmetrical, trachea midline. No masses visible.     Neurological/Psychiatric  Cranial Nerve Examination: II - XII grossly intact.  Orientation to person, place, and time: Normal.  Mood and affect: Normal.     Skin: Normal without rashes or lesions.     Pulmonary  Respiratory effort: Chest expands symmetrically.     Cardiovascular: Good peripheral pulses  Peripheral vascular system: No varicosities, carotid pulse normal, no edema. No jugular venous distension.     Extremities: Appearance of extremities: Normal. Gait normal.     Last Recorded Vitals:  Height 1.651 m (5' 5\"), weight 56.7 kg (125 lb).     Assessment/Plan   78 y.o. female with a history of mixed conductive and sensorineural hearing loss and bilateral TM perforations, she is s/p R U tympanomastoidectomy on 11/20/17. She's had some recent left-sided ear fullness, she denies drainage or pain on that side. We discussed left-sided ear " drops versus saline irrigations to the ear, would suggest saline irrigations due to consistency of middle ear debris, almost glue-like.     - Prescribed sterile saline, should use it 2-3 times weekly in her left ear via bulb syringe, should make sure solution is body temperature as it can cause vertigo   - RTC in 6 weeks      Scribe Attestation:  By signing my name below, I, Ashley Mcqueen , Scribe   attest that this documentation has been prepared under the direction and in the presence of Kerri Glez MD.    I have reviewed the documentation as scribed by Ashley Mcqueen and agree with the notes.   Kerri Glez MD

## 2024-01-30 NOTE — PATIENT INSTRUCTIONS
Welcome to Dr. Glez's clinic. We are here to assist you through your ENT care at [facility].  Dr. Glez is an Ear surgeon. This means that she specializes in taking care of patients with complex ear problems.     Dr. Glez's office number is [phone]. While you may see her at a satellite office, she has a team committed to help meet your healthcare needs at [facility]'s [facility]. This number is the most direct way to communicate with the office.     Mary is Dr. Glez's  and she answers the office phone from 8am-4pm Mon-Fri. She can help you with many general questions and information. Questions that she cannot answer will be directed to the appropriate staff. You may need to leave a message. In this case, someone from the team will call you back.     Hector is Dr. Glez's primary nurse and can be reached by calling the office. Hector is in clinic with Dr. Glez's on Mondays and Tuesdays. Non-urgent calls will be returned on non-clinic days typically Thursdays.     Sometimes, other team members will also be involved in your care. These people may include dieticians, social workers, speech therapists, audiologist, neurologist, and physical therapist. Dr. Glez will provide these referrals as needed. Please let her know if you would like to request a specific referral.     For your convenience, Dr. Glez sees patients at several [facility] locations including [facility] and [facility] at the main campus of [facility]. While we try to make your appointments as convenient as possible, occasionally a visit to another location may be necessary to provide the best care for you. We look forward to working with you to meet your healthcare goals.     Dr. Glez makes every effort to run on time for your appointments. Therefore, if you are more than 30 minutes late unrelated to a scan or another appointment such therapy or audio you will have to reschedule.

## 2024-01-30 NOTE — LETTER
January 30, 2024     Elvi Granados MD  [address]  [address]  [facility] [address]    Patient: Liudmila Bal   YOB: 1945   Date of Visit: 1/30/2024       Dear Dr. Elvi Granados MD:    I had the pleasure of seeing your patient, Liudmila Bal today. Below are my notes for this consultation.  If you have questions, please do not hesitate to call me. Thank you for allowing me to participate in the care of your patient.        Sincerely,     Kerri Glez MD      CC: No Recipients  _____________________________________________________________________________    78 y.o. 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 She's had some recent left-sided ear fullness, she denies drainage or pain on that side. We discussed left-sided ear drops versus saline irrigations to the ear, would suggest saline irrigations due to consistency of middle ear debris, almost glue-like.     - Prescribed sterile saline, should use it 2-3 times weekly in her left ear via bulb syringe, should make sure solution is body temperature as it can cause vertigo   - RTC in 6 weeks

## 2024-01-31 ENCOUNTER — HOSPITAL ENCOUNTER (OUTPATIENT)
Dept: RADIOLOGY | Facility: CLINIC | Age: 79
Discharge: HOME | End: 2024-01-31
Payer: MEDICARE

## 2024-01-31 ENCOUNTER — OFFICE VISIT (OUTPATIENT)
Dept: PRIMARY CARE | Facility: CLINIC | Age: 79
End: 2024-01-31
Payer: MEDICARE

## 2024-01-31 VITALS
HEIGHT: 65 IN | DIASTOLIC BLOOD PRESSURE: 67 MMHG | BODY MASS INDEX: 19.66 KG/M2 | OXYGEN SATURATION: 97 % | HEART RATE: 75 BPM | WEIGHT: 118 LBS | SYSTOLIC BLOOD PRESSURE: 130 MMHG

## 2024-01-31 DIAGNOSIS — I42.2 OTHER HYPERTROPHIC CARDIOMYOPATHY (MULTI): Primary | ICD-10-CM

## 2024-01-31 DIAGNOSIS — G89.29 CHRONIC PAIN OF RIGHT KNEE: ICD-10-CM

## 2024-01-31 DIAGNOSIS — R94.6 ABNORMAL THYROID EXAM: ICD-10-CM

## 2024-01-31 DIAGNOSIS — I10 PRIMARY HYPERTENSION: ICD-10-CM

## 2024-01-31 DIAGNOSIS — R73.9 ELEVATED BLOOD SUGAR: ICD-10-CM

## 2024-01-31 DIAGNOSIS — D49.0 TUMOR OF PAROTID GLAND: ICD-10-CM

## 2024-01-31 DIAGNOSIS — M25.561 CHRONIC PAIN OF RIGHT KNEE: ICD-10-CM

## 2024-01-31 DIAGNOSIS — E55.9 VITAMIN D DEFICIENCY: ICD-10-CM

## 2024-01-31 PROCEDURE — 3078F DIAST BP <80 MM HG: CPT | Performed by: FAMILY MEDICINE

## 2024-01-31 PROCEDURE — 99214 OFFICE O/P EST MOD 30 MIN: CPT | Performed by: FAMILY MEDICINE

## 2024-01-31 PROCEDURE — 1160F RVW MEDS BY RX/DR IN RCRD: CPT | Performed by: FAMILY MEDICINE

## 2024-01-31 PROCEDURE — 1036F TOBACCO NON-USER: CPT | Performed by: FAMILY MEDICINE

## 2024-01-31 PROCEDURE — G2211 COMPLEX E/M VISIT ADD ON: HCPCS | Performed by: FAMILY MEDICINE

## 2024-01-31 PROCEDURE — 73562 X-RAY EXAM OF KNEE 3: CPT | Mod: RIGHT SIDE | Performed by: RADIOLOGY

## 2024-01-31 PROCEDURE — 3075F SYST BP GE 130 - 139MM HG: CPT | Performed by: FAMILY MEDICINE

## 2024-01-31 PROCEDURE — 73562 X-RAY EXAM OF KNEE 3: CPT | Mod: RT

## 2024-01-31 PROCEDURE — 1159F MED LIST DOCD IN RCRD: CPT | Performed by: FAMILY MEDICINE

## 2024-01-31 RX ORDER — DOXAZOSIN 2 MG/1
2 TABLET ORAL NIGHTLY
Qty: 90 TABLET | Refills: 1 | Status: SHIPPED | OUTPATIENT
Start: 2024-01-31

## 2024-01-31 RX ORDER — LISINOPRIL 40 MG/1
40 TABLET ORAL DAILY
Qty: 90 TABLET | Refills: 1 | Status: SHIPPED | OUTPATIENT
Start: 2024-01-31

## 2024-01-31 ASSESSMENT — PATIENT HEALTH QUESTIONNAIRE - PHQ9
2. FEELING DOWN, DEPRESSED OR HOPELESS: NOT AT ALL
SUM OF ALL RESPONSES TO PHQ9 QUESTIONS 1 AND 2: 0
1. LITTLE INTEREST OR PLEASURE IN DOING THINGS: NOT AT ALL

## 2024-03-12 ENCOUNTER — APPOINTMENT (OUTPATIENT)
Dept: OTOLARYNGOLOGY | Facility: CLINIC | Age: 79
End: 2024-03-12
Payer: MEDICARE

## 2024-05-07 ENCOUNTER — APPOINTMENT (OUTPATIENT)
Dept: OTOLARYNGOLOGY | Facility: CLINIC | Age: 79
End: 2024-05-07
Payer: MEDICARE

## 2024-07-03 ENCOUNTER — LAB (OUTPATIENT)
Dept: LAB | Facility: LAB | Age: 79
End: 2024-07-03
Payer: MEDICARE

## 2024-07-03 DIAGNOSIS — E55.9 VITAMIN D DEFICIENCY: ICD-10-CM

## 2024-07-03 DIAGNOSIS — I10 PRIMARY HYPERTENSION: ICD-10-CM

## 2024-07-03 DIAGNOSIS — R73.9 ELEVATED BLOOD SUGAR: ICD-10-CM

## 2024-07-03 DIAGNOSIS — R94.6 ABNORMAL THYROID EXAM: ICD-10-CM

## 2024-07-03 LAB
25(OH)D3 SERPL-MCNC: 52 NG/ML (ref 30–100)
ALBUMIN SERPL BCP-MCNC: 4.3 G/DL (ref 3.4–5)
ALP SERPL-CCNC: 62 U/L (ref 33–136)
ALT SERPL W P-5'-P-CCNC: 12 U/L (ref 7–45)
ANION GAP SERPL CALC-SCNC: 13 MMOL/L (ref 10–20)
AST SERPL W P-5'-P-CCNC: 18 U/L (ref 9–39)
BILIRUB SERPL-MCNC: 0.4 MG/DL (ref 0–1.2)
BUN SERPL-MCNC: 17 MG/DL (ref 6–23)
CALCIUM SERPL-MCNC: 10.8 MG/DL (ref 8.6–10.6)
CHLORIDE SERPL-SCNC: 107 MMOL/L (ref 98–107)
CHOLEST SERPL-MCNC: 214 MG/DL (ref 0–199)
CHOLESTEROL/HDL RATIO: 3.2
CO2 SERPL-SCNC: 30 MMOL/L (ref 21–32)
CREAT SERPL-MCNC: 0.94 MG/DL (ref 0.5–1.05)
CREAT UR-MCNC: 137.4 MG/DL (ref 20–320)
EGFRCR SERPLBLD CKD-EPI 2021: 61 ML/MIN/1.73M*2
ERYTHROCYTE [DISTWIDTH] IN BLOOD BY AUTOMATED COUNT: 14.6 % (ref 11.5–14.5)
EST. AVERAGE GLUCOSE BLD GHB EST-MCNC: 117 MG/DL
GLUCOSE SERPL-MCNC: 114 MG/DL (ref 74–99)
HBA1C MFR BLD: 5.7 %
HCT VFR BLD AUTO: 37.9 % (ref 36–46)
HDLC SERPL-MCNC: 67.3 MG/DL
HGB BLD-MCNC: 11.9 G/DL (ref 12–16)
LDLC SERPL CALC-MCNC: 129 MG/DL
MCH RBC QN AUTO: 28.6 PG (ref 26–34)
MCHC RBC AUTO-ENTMCNC: 31.4 G/DL (ref 32–36)
MCV RBC AUTO: 91 FL (ref 80–100)
MICROALBUMIN UR-MCNC: 11.9 MG/L
MICROALBUMIN/CREAT UR: 8.7 UG/MG CREAT
NON HDL CHOLESTEROL: 147 MG/DL (ref 0–149)
NRBC BLD-RTO: 0 /100 WBCS (ref 0–0)
PLATELET # BLD AUTO: 291 X10*3/UL (ref 150–450)
POTASSIUM SERPL-SCNC: 4.7 MMOL/L (ref 3.5–5.3)
PROT SERPL-MCNC: 6.3 G/DL (ref 6.4–8.2)
RBC # BLD AUTO: 4.16 X10*6/UL (ref 4–5.2)
SODIUM SERPL-SCNC: 145 MMOL/L (ref 136–145)
TRIGL SERPL-MCNC: 90 MG/DL (ref 0–149)
TSH SERPL-ACNC: 1.01 MIU/L (ref 0.44–3.98)
VIT B12 SERPL-MCNC: 304 PG/ML (ref 211–911)
VLDL: 18 MG/DL (ref 0–40)
WBC # BLD AUTO: 5.4 X10*3/UL (ref 4.4–11.3)

## 2024-07-03 PROCEDURE — 85027 COMPLETE CBC AUTOMATED: CPT

## 2024-07-03 PROCEDURE — 80061 LIPID PANEL: CPT

## 2024-07-03 PROCEDURE — 82306 VITAMIN D 25 HYDROXY: CPT

## 2024-07-03 PROCEDURE — 36415 COLL VENOUS BLD VENIPUNCTURE: CPT

## 2024-07-03 PROCEDURE — 80053 COMPREHEN METABOLIC PANEL: CPT

## 2024-07-03 PROCEDURE — 82570 ASSAY OF URINE CREATININE: CPT

## 2024-07-03 PROCEDURE — 84443 ASSAY THYROID STIM HORMONE: CPT

## 2024-07-03 PROCEDURE — 82043 UR ALBUMIN QUANTITATIVE: CPT

## 2024-07-03 PROCEDURE — 83036 HEMOGLOBIN GLYCOSYLATED A1C: CPT

## 2024-07-03 PROCEDURE — 82607 VITAMIN B-12: CPT

## 2024-07-23 ENCOUNTER — APPOINTMENT (OUTPATIENT)
Dept: OTOLARYNGOLOGY | Facility: CLINIC | Age: 79
End: 2024-07-23
Payer: MEDICARE

## 2024-07-23 NOTE — PROGRESS NOTES
"Subjective   Patient ID: Liudmila Bal is a 78 y.o. female who presents for Medicare Annual Wellness Visit Subsequent.      HPI   The patient reports that she is taking lisinopril and doxazosin for hypertension as prescribed and is tolerating it them well. Her cardiomyopathy is stable on the current dose of lisinopril. She complains of chronic right knee pain and has had an x-ray done.    Review of Systems  Constitutional: No fever or chills, No Night Sweats  Eyes: No Blurry Vision or Eye sight problems  ENT: No Nasal Discharge, Hoarseness, sore throat  Cardiovascular: no chest pain, no palpitations and no syncope.   Respiratory: no cough, no shortness of breath during exertion and no shortness of breath at rest.   Gastrointestinal: no abdominal pain, no nausea and no vomiting.   : No vaginal discharge, burning with urination, no blood in urine or stools  Skin: No Skin rashes or Lesions  Neuro: No Headache, no dizziness or Numbness or tingling  Psych: No Anxiety, depression or sleeping problems  Heme: No Easy bleeding or brusing.   MSK: + right knee pain    Objective   /67   Pulse 90   Ht 1.626 m (5' 4\")   Wt 55.3 kg (122 lb)   SpO2 94%   BMI 20.94 kg/m²     Physical Exam  Constitutional: Alert and in no acute distress. Well developed, well nourished.   Head and Face: Head and face: Normal.    Eyes: Normal external exam. Pupils were equal in size, round, reactive to light (PERRL) with normal accommodation and extraocular movements intact (EOMI).   Ears, Nose, Mouth, and Throat: External inspection of ears and nose: Normal.  Hearing: Normal.  Nasal mucosa, septum, and turbinates: Normal.  Lips, teeth, and gums: Normal.  Oropharynx: Normal.   Neck: No neck mass was observed. Supple. Thyroid not enlarged and there were no palpable thyroid nodules.   Cardiovascular: Heart rate and rhythm were normal, normal S1 and S2. Pedal pulses: Normal. No peripheral edema.   Pulmonary: No respiratory distress. " Clear bilateral breath sounds.   Abdomen: Soft nontender; no abdominal mass palpated. Normal bowel sounds. No organomegaly.   Musculoskeletal: No joint swelling seen, normal movements of all extremities. Range of motion: Normal.  Muscle strength/tone: Normal.    Skin: Normal skin color and pigmentation, normal skin turgor, and no rash.   Neurologic: Deep tendon reflexes were 2+ and symmetric.   Psychiatric: Judgment and insight: Intact. Mood and affect: Normal.  Lymphatic: No cervical lymphadenopathy. Palpation of lymph nodes in axillae: Normal.  Palpation of lymph nodes in groin: Normal.    Lab Results   Component Value Date    WBC 5.4 07/03/2024    HGB 11.9 (L) 07/03/2024    HCT 37.9 07/03/2024     07/03/2024    CHOL 214 (H) 07/03/2024    TRIG 90 07/03/2024    HDL 67.3 07/03/2024    ALT 12 07/03/2024    AST 18 07/03/2024     07/03/2024    K 4.7 07/03/2024     07/03/2024    CREATININE 0.94 07/03/2024    BUN 17 07/03/2024    CO2 30 07/03/2024    TSH 1.01 07/03/2024    INR 1.1 11/13/2017    HGBA1C 5.7 (H) 07/03/2024       XR knee right 3 views  Narrative: Interpreted By:  Tye Kate,   STUDY:  XR KNEE RIGHT 3 VIEWS; ;  1/31/2024 11:46 am      INDICATION:  Signs/Symptoms:right knee stiffness.      COMPARISON:  01/31/2024      ACCESSION NUMBER(S):  [id number]      ORDERING CLINICIAN:  ERWIN IRWIN      FINDINGS:  Right knee, three views      Linear sclerosis in the distal femoral metadiaphysis is nonspecific  and may represent sequelae of prior injury or prior intervention. No  meniscal tear is seen however.      There is no fracture dislocation. There is no effusion. There is mild  chondrocalcinosis. No significant degenerative changes seen.      Impression: Mild chondrocalcinosis which can be seen with CPPD arthropathy. No  degenerative changes seen..          MACRO:  None      Signed by: Tye Ktae 2/1/2024 6:45 PM  Dictation workstation:   [id number]      Assessment/Plan    Diagnoses and all orders for this visit:  Medicare annual wellness visit, subsequent  Other hypertrophic cardiomyopathy (Multi)  -     Follow Up In [facility] - PCP - Medicare Annual  Primary hypertension  -     Follow Up In [facility] - PCP - Established; Future  -     Renal Function Panel; Future  -     doxazosin (Cardura) 2 mg tablet; Take 1 tablet (2 mg) by mouth once daily at bedtime.  -     lisinopril 40 mg tablet; Take 1 tablet (40 mg) by mouth once daily. for blood pressure  Asymptomatic menopausal state  -     XR DEXA bone density; Future  Encounter for screening mammogram for breast cancer  -     BI mammo bilateral screening tomosynthesis; Future  Pseudogout of knee, right  -     colchicine 0.6 mg tablet; Take 1 tablet (0.6 mg) by mouth once daily.        Dear Liudmila Bal     It was my pleasure to take care of you today in the office. Below are the things we discussed today:    1. 1. Immunizations: Yearly Flu shot is recommended.          a: COVID: Please get new booster from the pharmacy in fall          b: Tetanus: Up-to-date. Done in 2023          c: Shingrix: 1st shot up-to-date. Please get 2nd shot from the pharmacy          d: Pneumovax: Up-to-date         e: Prevnar: Up-to-date         F. RSV: Please get from the pharmacy in fall     2. Blood Work: Reviewed   3. Seen your dentist twice a year  4. Yearly Eye exam is recommended    5. BMI: Normal   6: Diet recommendations:   Eat Clean, Try to have as many home cooked meals as possible  Avoid processed foods which contain excess calories, sugar, and sodium.    7. Exercise recommendations:   150 minutes a week to maintain your weight     If you have to loose weight, you need a better diet and exercise plan.     8. Supplements recommended:  a - Calcium 600 mg up to twice a day to get a total of 1200 mg. Each 8 oz of milk or yogurt or 1 oz of cheese, 1 Banana, 1 serving of green Leafy vegetable has about 300 mg of Calcium,  so you may subtract that amount. Calcium citrate is the only acceptable supplement to take if you take an acid suppressing medication like Prilosec; otherwise Calcium carbonate is acceptable too (It can cause Constipation).   b - Vitamin D - 2000 IU daily     9. Please get your Living will / Advance directive completed if you do not have one already. Please make sure our office has a copy of the latest one.     10. Colonoscopy: Uptodate. Done in July 2022, repeat in July 2025   11. Mammogram: Ordered   12. PAP: Not indicated   13. DEXA: Ordered   14: Skin Check: Please see Dermatology once a year for a Skin Check.     15. Hypertension: Continue doxazosin and lisinopril.     16. Cardiomyopathy: Stable. Continue lisinopril.     17. Protein calorie malnutrition: Encouraged her to incorporate more protein into her diet.    18. Vitamin B-12 deficiency: Take over-the-counter 500 mcg vitamin B-12.     19. Hyperparathyroidism: The patient is following up with Endocrinology.     20. Pseudogout of the right knee: Start colchicine. Advised her to take every other day or stop taking it and let me know if she experiences any side effects from it. She will see ortho for an injection if there is no improvement after taking these medication.    21. Cerumen impaction: Cerumen successfully removed.    Follow up in 6 months. Please get blood work done prior to your appointment.     Follow up in one year for a Physical. Please call the office before your Physical to see if you need blood work completed prior to your physical.     Please call me if any questions arise from now until your next visit. I will call you after I am done seeing patients. A Doctor is always available by phone when the office is closed. Please feel free to call for help with any problem that you feel shouldn't wait until the office re-opens.     Scribe Attestation  By signing my name below, IDolly Scribe   attest that this documentation has been  prepared under the direction and in the presence of Elvi Granados MD.

## 2024-07-24 DIAGNOSIS — I10 PRIMARY HYPERTENSION: ICD-10-CM

## 2024-07-24 RX ORDER — DOXAZOSIN 2 MG/1
2 TABLET ORAL NIGHTLY
Qty: 90 TABLET | Refills: 1 | OUTPATIENT
Start: 2024-07-24

## 2024-07-26 ENCOUNTER — APPOINTMENT (OUTPATIENT)
Dept: PRIMARY CARE | Facility: CLINIC | Age: 79
End: 2024-07-26
Payer: MEDICARE

## 2024-07-26 VITALS
HEIGHT: 64 IN | DIASTOLIC BLOOD PRESSURE: 67 MMHG | BODY MASS INDEX: 20.83 KG/M2 | OXYGEN SATURATION: 94 % | SYSTOLIC BLOOD PRESSURE: 132 MMHG | WEIGHT: 122 LBS | HEART RATE: 90 BPM

## 2024-07-26 DIAGNOSIS — I42.2 OTHER HYPERTROPHIC CARDIOMYOPATHY (MULTI): ICD-10-CM

## 2024-07-26 DIAGNOSIS — M11.261 PSEUDOGOUT OF KNEE, RIGHT: ICD-10-CM

## 2024-07-26 DIAGNOSIS — I10 PRIMARY HYPERTENSION: ICD-10-CM

## 2024-07-26 DIAGNOSIS — Z00.00 MEDICARE ANNUAL WELLNESS VISIT, SUBSEQUENT: Primary | ICD-10-CM

## 2024-07-26 DIAGNOSIS — Z78.0 ASYMPTOMATIC MENOPAUSAL STATE: ICD-10-CM

## 2024-07-26 DIAGNOSIS — Z12.31 ENCOUNTER FOR SCREENING MAMMOGRAM FOR BREAST CANCER: ICD-10-CM

## 2024-07-26 PROBLEM — D49.0 TUMOR OF PAROTID GLAND: Status: RESOLVED | Noted: 2023-10-22 | Resolved: 2024-07-26

## 2024-07-26 PROBLEM — H90.A22 SENSORINEURAL HEARING LOSS (SNHL) OF LEFT EAR WITH RESTRICTED HEARING OF RIGHT EAR: Status: RESOLVED | Noted: 2023-10-22 | Resolved: 2024-07-26

## 2024-07-26 PROBLEM — H90.A31 MIXED CONDUCTIVE AND SENSORINEURAL HEARING LOSS, UNILATERAL, RIGHT EAR WITH RESTRICTED HEARING ON THE CONTRALATERAL SIDE: Status: RESOLVED | Noted: 2023-10-22 | Resolved: 2024-07-26

## 2024-07-26 PROBLEM — H91.90 HEARING LOSS: Status: RESOLVED | Noted: 2023-10-22 | Resolved: 2024-07-26

## 2024-07-26 PROBLEM — H93.8X2 EAR FULLNESS, LEFT: Status: RESOLVED | Noted: 2024-01-30 | Resolved: 2024-07-26

## 2024-07-26 PROCEDURE — 99397 PER PM REEVAL EST PAT 65+ YR: CPT | Performed by: FAMILY MEDICINE

## 2024-07-26 PROCEDURE — 1036F TOBACCO NON-USER: CPT | Performed by: FAMILY MEDICINE

## 2024-07-26 PROCEDURE — 99214 OFFICE O/P EST MOD 30 MIN: CPT | Performed by: FAMILY MEDICINE

## 2024-07-26 PROCEDURE — 3075F SYST BP GE 130 - 139MM HG: CPT | Performed by: FAMILY MEDICINE

## 2024-07-26 PROCEDURE — 1160F RVW MEDS BY RX/DR IN RCRD: CPT | Performed by: FAMILY MEDICINE

## 2024-07-26 PROCEDURE — G0439 PPPS, SUBSEQ VISIT: HCPCS | Performed by: FAMILY MEDICINE

## 2024-07-26 PROCEDURE — 1159F MED LIST DOCD IN RCRD: CPT | Performed by: FAMILY MEDICINE

## 2024-07-26 PROCEDURE — 3078F DIAST BP <80 MM HG: CPT | Performed by: FAMILY MEDICINE

## 2024-07-26 PROCEDURE — 1170F FXNL STATUS ASSESSED: CPT | Performed by: FAMILY MEDICINE

## 2024-07-26 PROCEDURE — 1123F ACP DISCUSS/DSCN MKR DOCD: CPT | Performed by: FAMILY MEDICINE

## 2024-07-26 RX ORDER — LISINOPRIL 40 MG/1
40 TABLET ORAL DAILY
Qty: 90 TABLET | Refills: 1 | Status: SHIPPED | OUTPATIENT
Start: 2024-07-26

## 2024-07-26 RX ORDER — COLCHICINE 0.6 MG/1
0.6 TABLET ORAL DAILY
Qty: 90 TABLET | Refills: 1 | Status: SHIPPED | OUTPATIENT
Start: 2024-07-26

## 2024-07-26 RX ORDER — DOXAZOSIN 2 MG/1
2 TABLET ORAL NIGHTLY
Qty: 90 TABLET | Refills: 1 | Status: SHIPPED | OUTPATIENT
Start: 2024-07-26

## 2024-07-26 ASSESSMENT — ACTIVITIES OF DAILY LIVING (ADL)
DRESSING: INDEPENDENT
BATHING: INDEPENDENT
MANAGING_FINANCES: INDEPENDENT
DOING_HOUSEWORK: INDEPENDENT
GROCERY_SHOPPING: INDEPENDENT
TAKING_MEDICATION: INDEPENDENT

## 2024-07-26 ASSESSMENT — COLUMBIA-SUICIDE SEVERITY RATING SCALE - C-SSRS
2. HAVE YOU ACTUALLY HAD ANY THOUGHTS OF KILLING YOURSELF?: NO
1. IN THE PAST MONTH, HAVE YOU WISHED YOU WERE DEAD OR WISHED YOU COULD GO TO SLEEP AND NOT WAKE UP?: NO

## 2024-07-26 NOTE — PATIENT INSTRUCTIONS
Immunizations you need to get from the pharmacy: Get 2nd Shingrix and RSV and in the fall get Flu and COVID booster    Scheduling Radiology tests: [phone]    If you need labs done, please go to any  lab, no paperwork needed. If a Lipid panel is ordered, you will need to fast overnight, other blood work does not require fasting.   Lab in this building is in [address] (M-F 7:30-5:00pm and Sat 8-12pm)    Please call me if any questions arise from now until your next visit. I will call you after I am done seeing patients. A Doctor is always available by phone when the office is closed. Please feel free to call for help with any problem that you feel shouldn't wait until the office re-opens.

## 2024-07-30 ENCOUNTER — HOSPITAL ENCOUNTER (OUTPATIENT)
Dept: RADIOLOGY | Facility: CLINIC | Age: 79
Discharge: HOME | End: 2024-07-30
Payer: MEDICARE

## 2024-07-30 VITALS — BODY MASS INDEX: 20.83 KG/M2 | WEIGHT: 122 LBS | HEIGHT: 64 IN

## 2024-07-30 DIAGNOSIS — Z12.31 ENCOUNTER FOR SCREENING MAMMOGRAM FOR BREAST CANCER: ICD-10-CM

## 2024-07-30 DIAGNOSIS — Z78.0 ASYMPTOMATIC MENOPAUSAL STATE: ICD-10-CM

## 2024-07-30 PROCEDURE — 77067 SCR MAMMO BI INCL CAD: CPT | Mod: LT

## 2024-07-30 PROCEDURE — 77080 DXA BONE DENSITY AXIAL: CPT | Performed by: RADIOLOGY

## 2024-07-30 PROCEDURE — 77067 SCR MAMMO BI INCL CAD: CPT | Mod: LEFT SIDE | Performed by: STUDENT IN AN ORGANIZED HEALTH CARE EDUCATION/TRAINING PROGRAM

## 2024-07-30 PROCEDURE — 77080 DXA BONE DENSITY AXIAL: CPT

## 2024-07-30 PROCEDURE — 77063 BREAST TOMOSYNTHESIS BI: CPT | Mod: LEFT SIDE | Performed by: STUDENT IN AN ORGANIZED HEALTH CARE EDUCATION/TRAINING PROGRAM

## 2024-07-30 ASSESSMENT — LIFESTYLE VARIABLES
3_OR_MORE_DRINKS_PER_DAY: N
CURRENT_SMOKER: N

## 2024-08-12 ENCOUNTER — APPOINTMENT (OUTPATIENT)
Dept: ENDOCRINOLOGY | Facility: CLINIC | Age: 79
End: 2024-08-12
Payer: MEDICARE

## 2024-08-12 VITALS
WEIGHT: 118.4 LBS | DIASTOLIC BLOOD PRESSURE: 98 MMHG | BODY MASS INDEX: 20.22 KG/M2 | RESPIRATION RATE: 16 BRPM | HEART RATE: 92 BPM | HEIGHT: 64 IN | SYSTOLIC BLOOD PRESSURE: 150 MMHG

## 2024-08-12 DIAGNOSIS — M85.80 OSTEOPENIA, UNSPECIFIED LOCATION: ICD-10-CM

## 2024-08-12 DIAGNOSIS — E83.52 HYPERCALCEMIA: Primary | ICD-10-CM

## 2024-08-12 DIAGNOSIS — E55.9 VITAMIN D DEFICIENCY: ICD-10-CM

## 2024-08-12 PROCEDURE — 1036F TOBACCO NON-USER: CPT | Performed by: INTERNAL MEDICINE

## 2024-08-12 PROCEDURE — 1160F RVW MEDS BY RX/DR IN RCRD: CPT | Performed by: INTERNAL MEDICINE

## 2024-08-12 PROCEDURE — 3077F SYST BP >= 140 MM HG: CPT | Performed by: INTERNAL MEDICINE

## 2024-08-12 PROCEDURE — 99214 OFFICE O/P EST MOD 30 MIN: CPT | Performed by: INTERNAL MEDICINE

## 2024-08-12 PROCEDURE — 3080F DIAST BP >= 90 MM HG: CPT | Performed by: INTERNAL MEDICINE

## 2024-08-12 PROCEDURE — 1123F ACP DISCUSS/DSCN MKR DOCD: CPT | Performed by: INTERNAL MEDICINE

## 2024-08-12 PROCEDURE — 1159F MED LIST DOCD IN RCRD: CPT | Performed by: INTERNAL MEDICINE

## 2024-08-12 ASSESSMENT — ENCOUNTER SYMPTOMS
COUGH: 0
FATIGUE: 0
VOMITING: 0
SHORTNESS OF BREATH: 0
NAUSEA: 0
HEADACHES: 0
FEVER: 0
CHILLS: 0
DIARRHEA: 0
PALPITATIONS: 0

## 2024-08-12 NOTE — ASSESSMENT & PLAN NOTE
Labs reviewed  Next lab draw will repeat pth.   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   Parameters currently stable so will hold off  May consider repeat urine testing next year    Orders:    Parathyroid Hormone, Intact; Future    Renal Function Panel; Future

## 2024-08-12 NOTE — PROGRESS NOTES
Endocrinology: Follow up visit  Subjective   Patient ID: Liudmila Bal is a 78 y.o. female who presents for Abnormal Calcium, Vitamin D Deficiency, and osteopenia.    PCP: Elvi Granados MD    HPI  Here for follow up primary hyperpara, d def and osteopenia.  Since last visit a year ago doing well.  Continues on d daily (this year states current supplement is 1000 international units daily)   feeling well.   Have discussed surgical referral for pth ectomy in the past and has refused unless labs/dexa change.   Recent calcium values stable.   Dexa 7/2024 stable osteopenia,  no falls/fxs this year      Review of Systems   Constitutional:  Negative for chills, fatigue and fever.   Respiratory:  Negative for cough and shortness of breath.    Cardiovascular:  Negative for chest pain and palpitations.   Gastrointestinal:  Negative for diarrhea, nausea and vomiting.   Neurological:  Negative for headaches.       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    Hypercalcemia    Left inguinal hernia    Osteopenia    Other specified menopausal and perimenopausal disorders    Tinnitus, bilateral    Perforation of left tympanic membrane    Pseudogout of knee, right        Home Meds:  Current Outpatient Medications   Medication Instructions    acetaminophen (Tylenol) 325 mg tablet oral    cholecalciferol, vitamin D3, 25 mcg (1,000 unit) tablet,chewable oral    colchicine 0.6 mg, oral, Daily    doxazosin (CARDURA) 2 mg, oral, Nightly    lisinopril 40 mg, oral, Daily, for blood pressure        Allergies   Allergen Reactions    Simvastatin Other     Rhabdomyolysis  Paresthesia  "   Tamoxifen Other     TEMPORARY BLINDNESS IN AN EYE    Fluticasone Itching     Nosebleeds        Objective   Vitals:    08/12/24 0818   BP: (!) 150/98   Pulse: 92   Resp: 16      Vitals:    08/12/24 0818   Weight: 53.7 kg (118 lb 6.4 oz)      Body mass index is 20.31 kg/m².   Physical Exam  Constitutional:       Appearance: Normal appearance. She is normal weight.   HENT:      Head: Normocephalic and atraumatic.   Neck:      Thyroid: No thyroid mass, thyromegaly or thyroid tenderness.   Cardiovascular:      Rate and Rhythm: Normal rate and regular rhythm.      Heart sounds: No murmur heard.     No gallop.   Pulmonary:      Effort: Pulmonary effort is normal.      Breath sounds: Normal breath sounds.   Abdominal:      Palpations: Abdomen is soft.      Comments: benign   Neurological:      General: No focal deficit present.      Mental Status: She is alert and oriented to person, place, and time.      Deep Tendon Reflexes: Reflexes are normal and symmetric.   Psychiatric:         Behavior: Behavior is cooperative.         Labs:  Lab Results   Component Value Date    HGBA1C 5.7 (H) 07/03/2024    TSH 1.01 07/03/2024      No results found for: \"PR1\", \"THYROIDPAB\", \"TSI\"     Assessment/Plan   Assessment & Plan  Hypercalcemia  Labs reviewed  Next lab draw will repeat pth.   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   Parameters currently stable so will hold off  May consider repeat urine testing next year    Orders:    Parathyroid Hormone, Intact; Future    Renal Function Panel; Future    Vitamin D deficiency    D replacement excellent at current dose  Osteopenia, unspecified location    Dexa stable  Follow up in one year      Electronically signed by:  Shira Kern MD 08/12/24 8:48 AM              "

## 2024-11-25 NOTE — PROGRESS NOTES
History Of Present Illness:  Liudmila Bal is a 79 y.o. 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  Recall, it was recommended she follow-up in 6 weeks and she presents today as a 10-month follow-up.  Since then, she has not noted any otorrhea or otalgia or ear infections.  She feels like her left-sided hearing is stable but notes a subjective decline on the right side.    Recall 1/30/24:  Liudmila Bal is a 78 y.o. 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 She's having left-sided ear fullness, denies any pain or drainage on that side. She's used drops in the past but she's unsure if they were helpful.     Surgical History:  She has a past surgical history that includes Lymph node biopsy (07/06/2012); Tubal ligation (07/06/2012); Lymph node biopsy (07/06/2012); Other surgical history (07/06/2012); Cervical laminectomy (07/06/2012); Other surgical history (07/06/2012); [place] lymph node biopsy (03/18/2014); Other surgical history (08/05/2019); Other surgical history (04/16/2014); Other surgical history (04/16/2014); Other surgical history (06/10/2014); Other surgical history (05/14/2019); Cataract extraction (04/01/2013); and Mastectomy (Right).     Allergies:  Simvastatin, Tamoxifen, and Fluticasone    Medications:   Current Outpatient Medications   Medication Instructions    acetaminophen (Tylenol) 325 mg tablet oral    cholecalciferol, vitamin D3, 25 mcg (1,000 unit) tablet,chewable oral    colchicine 0.6 mg, oral, Daily    doxazosin (CARDURA) 2 mg, oral, Nightly    lisinopril 40 mg, oral, Daily, for blood pressure      Review of Systems:   A comprehensive 10-point review of systems was obtained including constitutional, neurological, HEENT, pulmonary, cardiovascular,  genito-urinary, and other pertinent systems and was negative except as noted in the HPI.      Physical Exam:  Constitutional   General appearance: Healthy-appearing, well-nourished, well groomed, in no acute distress.   Ability to communicate: Normal communication without aids, normal voice quality.       Head and face: Atraumatic with no masses, lesions, or scarring.   Facial strength: Normal strength and symmetry, no synkinesis or facial tic.     Ears  Right: Ear canal clear, TM normal, no effusion or retraction  Left: TM perforation ~40-50%, thick glue ear mucous suctioned through perforation, middle ear mucosa does not appear particularly inflamed or edematous      Nose: Dorsum symmetric with no visible or palpable deformities.     Oral Cavity/Mouth  Lips, teeth, and gums: Normal lips, gums, and dentition.     Oropharynx: Mucosa moist, no lesions.     Neck: Symmetrical, trachea midline. No masses visible.     Neurological/Psychiatric  Cranial Nerve Examination: II - XII grossly intact.  Orientation to person, place, and time: Normal.  Mood and affect: Normal.     Skin: Normal without rashes or lesions.     Pulmonary  Respiratory effort: Chest expands symmetrically.     Cardiovascular: Good peripheral pulses  Peripheral vascular system: No varicosities, carotid pulse normal, no edema. No jugular venous distension.     Extremities: Appearance of extremities: Normal. Gait normal.     Procedure:  Procedure Note: Cerumen Removal  left tympanic membrane could not be visualized due to cerumen impaction. Verbal informed consent obtained from the patient to proceed with bedside cerumen disimpaction. Using the microscope and large speculum, the cerumen was removed from the left side using a right angle and 7 suction. Once the cerumen was removed the R TM was visualized and noted findings as above.     Last Recorded Vitals:  There were no vitals taken for this visit.    Relevant Results:  I personally reviewed the  audiogram from 11/26/2024 the following findings  Right ear: mild to profound mixed hearing loss.  Discrimination 100%  Left ear: mild to severe mixed hearing loss.  Discrimination 100%  Type B tymps    2021 audiogram showing bilateral mixed hearing loss.         Assessment/Plan   79 y.o. 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  Repeat audiogram today shows stable bilateral mixed hearing loss with type B tympanograms.    - Return in 6 months for repeat exam      Mp Staton, PGY3  Otolaryngology - Head & Neck Surgery     I saw and evaluated the patient. I personally obtained the key and critical portions of the history and physical exam or was physically present for key and critical portions performed by the resident/fellow. I reviewed the resident/fellow's documentation and discussed the patient with the resident/fellow. I agree with the resident/fellow's medical decision making as documented in the note.    Kerri Glez MD

## 2024-11-26 ENCOUNTER — CLINICAL SUPPORT (OUTPATIENT)
Dept: AUDIOLOGY | Facility: CLINIC | Age: 79
End: 2024-11-26
Payer: MEDICARE

## 2024-11-26 ENCOUNTER — APPOINTMENT (OUTPATIENT)
Dept: OTOLARYNGOLOGY | Facility: CLINIC | Age: 79
End: 2024-11-26
Payer: MEDICARE

## 2024-11-26 VITALS — HEIGHT: 64 IN | WEIGHT: 118 LBS | BODY MASS INDEX: 20.14 KG/M2

## 2024-11-26 DIAGNOSIS — H90.6 MIXED CONDUCTIVE AND SENSORINEURAL HEARING LOSS OF BOTH EARS: Primary | ICD-10-CM

## 2024-11-26 DIAGNOSIS — H90.A31 MIXED CONDUCTIVE AND SENSORINEURAL HEARING LOSS, UNILATERAL, RIGHT EAR WITH RESTRICTED HEARING ON THE CONTRALATERAL SIDE: ICD-10-CM

## 2024-11-26 DIAGNOSIS — H65.32 CHRONIC MUCOID OTITIS MEDIA OF LEFT EAR: ICD-10-CM

## 2024-11-26 DIAGNOSIS — H72.92 PERFORATION OF LEFT TYMPANIC MEMBRANE: Primary | ICD-10-CM

## 2024-11-26 PROCEDURE — 1036F TOBACCO NON-USER: CPT | Performed by: OTOLARYNGOLOGY

## 2024-11-26 PROCEDURE — 1123F ACP DISCUSS/DSCN MKR DOCD: CPT | Performed by: OTOLARYNGOLOGY

## 2024-11-26 PROCEDURE — 92557 COMPREHENSIVE HEARING TEST: CPT | Performed by: AUDIOLOGIST

## 2024-11-26 PROCEDURE — 1159F MED LIST DOCD IN RCRD: CPT | Performed by: OTOLARYNGOLOGY

## 2024-11-26 PROCEDURE — 1160F RVW MEDS BY RX/DR IN RCRD: CPT | Performed by: OTOLARYNGOLOGY

## 2024-11-26 PROCEDURE — 92504 EAR MICROSCOPY EXAMINATION: CPT | Performed by: OTOLARYNGOLOGY

## 2024-11-26 PROCEDURE — 99213 OFFICE O/P EST LOW 20 MIN: CPT | Performed by: OTOLARYNGOLOGY

## 2024-11-26 PROCEDURE — 92550 TYMPANOMETRY & REFLEX THRESH: CPT | Performed by: AUDIOLOGIST

## 2024-11-26 ASSESSMENT — PAIN SCALES - GENERAL: PAINLEVEL_OUTOF10: 0 - NO PAIN

## 2024-11-26 ASSESSMENT — PAIN - FUNCTIONAL ASSESSMENT: PAIN_FUNCTIONAL_ASSESSMENT: 0-10

## 2024-11-26 NOTE — PROGRESS NOTES
HISTORY:  History of mixed conductive and sensorineural hearing loss and bilateral TM perforations, she is s/p R CWU tympanomastoidectomy on 11/20/17.    Her previous hearing test was in 2021  She feels that her hearing is about the same as it was in 2021.  She feels that the left ear hears better than the right.   Tinnitus in the right ear that is constant.    No pain  No aural fullness  No vertigo  No falls    RESULTS:  Otoscopic inspection showed clear ear canals bilaterally.    Immittance testing showed flat tympanograms bilaterally. The large ear canal volume in the left ear suggests a perforation.   Ipsilateral acoustic reflexes were tested at 500-4000Hz in the right ear and were absent at 500-4000Hz in.    Pure tone air and bone conduction testing showed a moderate mixed hearing loss at 125Hz rising to mild at 250-4000Hz sloping to severe at 8000Hz in the left ear and a mild mixed hearing loss at 125Hz sloping to moderate at 250-2000Hz sloping to profound at 8000Hz in the right ear.    Word discrimination scores were excellent bilaterally.    IMPRESSIONS:  Ms. Bal has a bilateral mixed hearing loss right worse than left.  She does not feel that she is missing out on conversation at this time.      Treatment Plan:   Follow up with Dr. Glez.    Consider amplification in both ears (G[employer]RIELLA, hearing aids) when patient is interested.     TIME:    8648-5284

## 2024-11-26 NOTE — LETTER
November 26, 2024     Elvi Granados MD  [address]  [address]  [facility] [address]    Patient: Liudmila Bal   YOB: 1945   Date of Visit: 11/26/2024       Dear Dr. Elvi Granados MD:    I had the pleasure of seeing your patient, Liudmila Bal today. Below are my notes for this consultation.  If you have questions, please do not hesitate to call me. Thank you for allowing me to participate in the care of your patient.        Sincerely,     Kerri Glez MD      CC: No Recipients  _____________________________________________________________________________    79 y.o. 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  Repeat audiogram today shows stable bilateral mixed hearing loss with type B tympanograms.    - Return in 6 months for repeat exam      Mp Staton, PGY3  Otolaryngology - Head & Neck Surgery     I saw and evaluated the patient. I personally obtained the key and critical portions of the history and physical exam or was physically present for key and critical portions performed by the resident/fellow. I reviewed the resident/fellow's documentation and discussed the patient with the resident/fellow. I agree with the resident/fellow's medical decision making as documented in the note.    Kerri Glez MD

## 2025-01-15 DIAGNOSIS — I10 PRIMARY HYPERTENSION: ICD-10-CM

## 2025-01-16 RX ORDER — DOXAZOSIN 2 MG/1
2 TABLET ORAL NIGHTLY
Qty: 30 TABLET | Refills: 0 | Status: SHIPPED | OUTPATIENT
Start: 2025-01-16

## 2025-01-29 ENCOUNTER — APPOINTMENT (OUTPATIENT)
Dept: PRIMARY CARE | Facility: CLINIC | Age: 80
End: 2025-01-29
Payer: MEDICARE

## 2025-02-08 DIAGNOSIS — I10 PRIMARY HYPERTENSION: ICD-10-CM

## 2025-02-09 RX ORDER — DOXAZOSIN 2 MG/1
2 TABLET ORAL NIGHTLY
Qty: 30 TABLET | Refills: 0 | OUTPATIENT
Start: 2025-02-09

## 2025-02-12 ENCOUNTER — APPOINTMENT (OUTPATIENT)
Dept: PRIMARY CARE | Facility: CLINIC | Age: 80
End: 2025-02-12
Payer: MEDICARE

## 2025-02-12 VITALS
SYSTOLIC BLOOD PRESSURE: 150 MMHG | DIASTOLIC BLOOD PRESSURE: 90 MMHG | OXYGEN SATURATION: 94 % | BODY MASS INDEX: 20.43 KG/M2 | HEART RATE: 97 BPM | WEIGHT: 119 LBS

## 2025-02-12 DIAGNOSIS — R73.9 ELEVATED BLOOD SUGAR: ICD-10-CM

## 2025-02-12 DIAGNOSIS — I42.2 OTHER HYPERTROPHIC CARDIOMYOPATHY (MULTI): ICD-10-CM

## 2025-02-12 DIAGNOSIS — I10 PRIMARY HYPERTENSION: ICD-10-CM

## 2025-02-12 DIAGNOSIS — E55.9 VITAMIN D DEFICIENCY: ICD-10-CM

## 2025-02-12 DIAGNOSIS — D86.9 SARCOIDOSIS: ICD-10-CM

## 2025-02-12 DIAGNOSIS — I10 PRIMARY HYPERTENSION: Primary | ICD-10-CM

## 2025-02-12 DIAGNOSIS — R94.6 ABNORMAL THYROID EXAM: ICD-10-CM

## 2025-02-12 DIAGNOSIS — E83.52 HYPERCALCEMIA: ICD-10-CM

## 2025-02-12 PROBLEM — E78.5 HYPERLIPIDEMIA: Status: RESOLVED | Noted: 2023-07-12 | Resolved: 2025-02-12

## 2025-02-12 PROBLEM — R73.01 ELEVATED FASTING GLUCOSE: Status: RESOLVED | Noted: 2023-07-12 | Resolved: 2025-02-12

## 2025-02-12 PROCEDURE — 1159F MED LIST DOCD IN RCRD: CPT | Performed by: FAMILY MEDICINE

## 2025-02-12 PROCEDURE — 3077F SYST BP >= 140 MM HG: CPT | Performed by: FAMILY MEDICINE

## 2025-02-12 PROCEDURE — G2211 COMPLEX E/M VISIT ADD ON: HCPCS | Performed by: FAMILY MEDICINE

## 2025-02-12 PROCEDURE — 99214 OFFICE O/P EST MOD 30 MIN: CPT | Performed by: FAMILY MEDICINE

## 2025-02-12 PROCEDURE — 3080F DIAST BP >= 90 MM HG: CPT | Performed by: FAMILY MEDICINE

## 2025-02-12 PROCEDURE — 1160F RVW MEDS BY RX/DR IN RCRD: CPT | Performed by: FAMILY MEDICINE

## 2025-02-12 PROCEDURE — 1123F ACP DISCUSS/DSCN MKR DOCD: CPT | Performed by: FAMILY MEDICINE

## 2025-02-12 RX ORDER — DOXAZOSIN 2 MG/1
2 TABLET ORAL NIGHTLY
Qty: 90 TABLET | Refills: 1 | Status: SHIPPED | OUTPATIENT
Start: 2025-02-12

## 2025-02-12 RX ORDER — LISINOPRIL 40 MG/1
40 TABLET ORAL DAILY
Qty: 90 TABLET | Refills: 1 | Status: SHIPPED | OUTPATIENT
Start: 2025-02-12

## 2025-02-12 NOTE — ASSESSMENT & PLAN NOTE
Continue doxazosin and lisinopril.   Keep a BP log and bring to the office on next visit.   Orders:    Follow Up In [facility] - PCP - [facility]    Follow Up In [facility] - PCP - Medicare Annual; Future    CBC; Future    Comprehensive Metabolic Panel; Future    Lipid Panel; Future    Albumin-Creatinine Ratio, Urine Random; Future    doxazosin (Cardura) 2 mg tablet; Take 1 tablet (2 mg) by mouth once daily at bedtime.    lisinopril 40 mg tablet; Take 1 tablet (40 mg) by mouth once daily. for blood pressure

## 2025-02-12 NOTE — PROGRESS NOTES
Subjective   Patient ID: Liudmila Bal is a 79 y.o. female who presents for Follow-up and Hypertension.    HPI     The patient reports that she is taking lisinopril and doxazosin for hypertension, vitamin D, Tylenol as needed as prescribed and is tolerating it them well. Her cardiomyopathy is stable on the current dose of lisinopril. She complained of chronic right knee pain on her las visit and has had an x-ray done. Denies any falls in the last year.    Blood pressure is elevated today in office. She states her home blood pressure diastolic numbers are usually in the 140s.    She saw Dr. Kern recently for hypercalcemia.     Review of Systems  Constitutional: No fever or chills  Cardiovascular: no chest pain, no palpitations and no syncope.   Respiratory: no cough, no shortness of breath during exertion and no shortness of breath at rest.   Gastrointestinal: no abdominal pain, no nausea and no vomiting.  Neuro: No Headache, no dizziness    Objective   /90   Pulse 97   Wt 54 kg (119 lb)   SpO2 94%   BMI 20.43 kg/m²     Physical Exam  Constitutional: Alert and in no acute distress. Well developed, well nourished  Head and Face: Head and face: Normal.    Cardiovascular: Heart rate and rhythm were normal, normal S1 and S2. No peripheral edema.   Pulmonary: No respiratory distress. Clear bilateral breath sounds.  Musculoskeletal: Gait and station: Normal. Muscle strength/tone: Normal.   Skin: Normal skin color and pigmentation, normal skin turgor, and no rash.    Psychiatric: Judgment and insight: Intact. Mood and affect: Normal.      Lab Results   Component Value Date    WBC 5.4 07/03/2024    HGB 11.9 (L) 07/03/2024    HCT 37.9 07/03/2024     07/03/2024    CHOL 214 (H) 07/03/2024    TRIG 90 07/03/2024    HDL 67.3 07/03/2024    ALT 12 07/03/2024    AST 18 07/03/2024     07/03/2024    K 4.7 07/03/2024     07/03/2024    CREATININE 0.94 07/03/2024    BUN 17 07/03/2024    CO2 30 07/03/2024     TSH 1.01 07/03/2024    INR 1.1 11/13/2017    HGBA1C 5.7 (H) 07/03/2024       BI mammo left screening tomosynthesis  Narrative: Interpreted By:  Donny Warner,   STUDY:  BI MAMMO LEFT SCREENING TOMOSYNTHESIS;  7/30/2024 1:02 pm      ACCESSION NUMBER(S):  [id number]      ORDERING CLINICIAN:  ERWIN IRWIN      INDICATION:  Screening. Right mastectomy.      COMPARISON:  05/17/2023 and 05/16/2022      FINDINGS:  2D and tomosynthesis images were reviewed at 1 mm slice thickness.      Density:  The breast tissue is extremely dense, which may limit the  sensitivity of mammography.      No suspicious masses or calcifications are identified.      Impression: No mammographic evidence of malignancy.      BI-RADS CATEGORY:  BI-RADS Category:  1 Negative.  Recommendation:  Annual Screening.  Recommended Date:  1 Year.  Laterality:  Left.              For any future breast imaging appointments, please call [phone]  [phone]).          MACRO:  None      Signed by: Donny Warner 8/4/2024 2:20 PM  Dictation workstation:   [id number]      Assessment/Plan   Assessment & Plan  Primary hypertension  Continue doxazosin and lisinopril.   Keep a BP log and bring to the office on next visit.   Orders:    Follow Up In [facility] - PCP - [facility]    Follow Up In [facility] - PCP - Medicare Annual; Future    CBC; Future    Comprehensive Metabolic Panel; Future    Lipid Panel; Future    Albumin-Creatinine Ratio, Urine Random; Future    doxazosin (Cardura) 2 mg tablet; Take 1 tablet (2 mg) by mouth once daily at bedtime.    lisinopril 40 mg tablet; Take 1 tablet (40 mg) by mouth once daily. for blood pressure    Other hypertrophic cardiomyopathy (Multi)  Continue lisinopril.        Sarcoidosis  In remission.         Hypercalcemia  Stable. Follows with Dr. Kern       Vitamin D deficiency  Blood work ordered  Orders:    Vitamin D 25-Hydroxy,Total (for eval of Vitamin D levels); Future    Elevated blood  sugar  A1c ordered   Orders:    Hemoglobin A1C; Future    Vitamin B12; Future    Abnormal thyroid exam  TSH ordered  Orders:    TSH with reflex to Free T4 if abnormal; Future        Your yearly Physical is due in: Aug 2025  When you call the office for your yearly Physical, please ask them to inform me to order your blood work, so that you can get the fasting blood work before your appointment and we can discuss the results at your physical.      Please call me if any questions arise from now until your next visit. I will call you after I am done seeing patients. A Doctor is always available by phone when the office is closed. Please feel free to call for help with any problem that you feel shouldn't wait until the office re-opens.     Scribe Attestation  By signing my name below, IMckenzie Scribe   attest that this documentation has been prepared under the direction and in the presence of Elvi Granados MD.

## 2025-02-13 LAB
ALBUMIN SERPL-MCNC: 4.3 G/DL (ref 3.6–5.1)
BUN SERPL-MCNC: 18 MG/DL (ref 7–25)
BUN/CREAT SERPL: ABNORMAL (CALC) (ref 6–22)
CALCIUM SERPL-MCNC: 10.6 MG/DL (ref 8.6–10.4)
CHLORIDE SERPL-SCNC: 107 MMOL/L (ref 98–110)
CO2 SERPL-SCNC: 31 MMOL/L (ref 20–32)
CREAT SERPL-MCNC: 0.93 MG/DL (ref 0.6–0.95)
EGFRCR SERPLBLD CKD-EPI 2021: 62 ML/MIN/1.73M2
GLUCOSE SERPL-MCNC: 110 MG/DL (ref 65–99)
PHOSPHATE SERPL-MCNC: 3 MG/DL (ref 2.1–4.3)
POTASSIUM SERPL-SCNC: 4.1 MMOL/L (ref 3.5–5.3)
SODIUM SERPL-SCNC: 144 MMOL/L (ref 135–146)

## 2025-02-13 RX ORDER — DOXAZOSIN 2 MG/1
2 TABLET ORAL NIGHTLY
Qty: 30 TABLET | Refills: 0 | OUTPATIENT
Start: 2025-02-13

## 2025-05-26 NOTE — PROGRESS NOTES
History Of Present Illness:  Liudmila Bal is a 79 y.o. 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 Patient reports her symptoms have remained stable since her last visit.     Recall 11/26/24:  Liudmila Bal is a 79 y.o. 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  Recall, it was recommended she follow-up in 6 weeks and she presents today as a 10-month follow-up.  Since then, she has not noted any otorrhea or otalgia or ear infections.  She feels like her left-sided hearing is stable but notes a subjective decline on the right side.     Surgical History:  She has a past surgical history that includes Lymph node biopsy (07/06/2012); Tubal ligation (07/06/2012); Lymph node biopsy (07/06/2012); Other surgical history (07/06/2012); Cervical laminectomy (07/06/2012); Other surgical history (07/06/2012); [place] lymph node biopsy (03/18/2014); Other surgical history (08/05/2019); Other surgical history (04/16/2014); Other surgical history (04/16/2014); Other surgical history (06/10/2014); Other surgical history (05/14/2019); Cataract extraction (04/01/2013); and Mastectomy (Right).     Allergies:  Simvastatin, Tamoxifen, and Fluticasone    Medications:   Current Outpatient Medications   Medication Instructions    acetaminophen (Tylenol) 325 mg tablet Take by mouth.    cholecalciferol, vitamin D3, 25 mcg (1,000 unit) tablet,chewable Chew.    colchicine 0.6 mg, oral, Daily    doxazosin (CARDURA) 2 mg, oral, Nightly    lisinopril 40 mg, oral, Daily, for blood pressure      Review of Systems:   A comprehensive 10-point review of systems was obtained including constitutional, neurological, HEENT, pulmonary, cardiovascular, genito-urinary, and other pertinent systems and was negative except as noted  in the HPI.      Physical Exam:  Constitutional   General appearance: Healthy-appearing, well-nourished, well groomed, in no acute distress.   Ability to communicate: Normal communication without aids, normal voice quality.       Head and face: Atraumatic with no masses, lesions, or scarring.   Facial strength: Normal strength and symmetry, no synkinesis or facial tic.     Ears  Otoscopic examination: Facial nerve function is 1/6.  Left: Thick glue ear with a perforation that I suctioned, perforation is clean, and without cholesteatoma.  Right: Drum is intact with an effusion posteriorly.       Nose: Dorsum symmetric with no visible or palpable deformities.     Oral Cavity/Mouth  Lips, teeth, and gums: Normal lips, gums, and dentition.     Oropharynx: Mucosa moist, no lesions.     Neck: Symmetrical, trachea midline. No masses visible.     Neurological/Psychiatric  Cranial Nerve Examination: II - XII grossly intact.  Orientation to person, place, and time: Normal.  Mood and affect: Normal.     Skin: Normal without rashes or lesions.     Pulmonary  Respiratory effort: Chest expands symmetrically.     Cardiovascular: Good peripheral pulses  Peripheral vascular system: No varicosities, carotid pulse normal, no edema. No jugular venous distension.     Extremities: Appearance of extremities: Normal. Gait normal.     Procedure:  Ear Cleaning   Procedure: Cerumen Removal   Indication: Cerumen Impaction  Location: Both Ears  Prep: Nothing was placed in the ear canal(s) prior to the procedure.   Preformed by: The procedure was performed by the Provider.  Visualization Instrument: A Microscope and Speculum were placed in the ear canal(s) to visualize the ear canal debris.   Ear cleaning Instruments: Suction were used during the procedure.   Outcome: The procedure was successful.   Patient Status: The patient tolerated the procedure well.   Complications: There were no complications.   Patient Instructions: Avoid Using Q-Tips  "    Last Recorded Vitals:  Height 1.638 m (5' 4.5\"), weight 54.4 kg (120 lb).     Assessment/Plan   79 y.o. 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 Patient's symptoms have remained stable since her last visit. A bilateral ear cleaning was performed today which the patient tolerated well.     - RTC in 4 months    Scribe Attestation  By signing my name below, I, Sasha Nelson, [employer]rib[employer]   attest that this documentation has been prepared under the direction and in the presence of Kerri Glez MD.     I have reviewed the documentation as scribed by Hector Nelson and agree with the notes.   Kerri Glez MD  "

## 2025-05-27 ENCOUNTER — APPOINTMENT (OUTPATIENT)
Dept: OTOLARYNGOLOGY | Facility: CLINIC | Age: 80
End: 2025-05-27
Payer: MEDICARE

## 2025-05-27 VITALS — HEIGHT: 65 IN | BODY MASS INDEX: 19.99 KG/M2 | WEIGHT: 120 LBS

## 2025-05-27 DIAGNOSIS — H90.A31 MIXED CONDUCTIVE AND SENSORINEURAL HEARING LOSS, UNILATERAL, RIGHT EAR WITH RESTRICTED HEARING ON THE CONTRALATERAL SIDE: ICD-10-CM

## 2025-05-27 DIAGNOSIS — H72.92 PERFORATION OF LEFT TYMPANIC MEMBRANE: Primary | ICD-10-CM

## 2025-05-27 DIAGNOSIS — H65.32 CHRONIC MUCOID OTITIS MEDIA OF LEFT EAR: ICD-10-CM

## 2025-05-27 PROCEDURE — 92504 EAR MICROSCOPY EXAMINATION: CPT | Performed by: OTOLARYNGOLOGY

## 2025-05-27 PROCEDURE — 1159F MED LIST DOCD IN RCRD: CPT | Performed by: OTOLARYNGOLOGY

## 2025-05-27 PROCEDURE — 99213 OFFICE O/P EST LOW 20 MIN: CPT | Performed by: OTOLARYNGOLOGY

## 2025-05-27 PROCEDURE — 1160F RVW MEDS BY RX/DR IN RCRD: CPT | Performed by: OTOLARYNGOLOGY

## 2025-05-27 ASSESSMENT — PATIENT HEALTH QUESTIONNAIRE - PHQ9
1. LITTLE INTEREST OR PLEASURE IN DOING THINGS: NOT AT ALL
SUM OF ALL RESPONSES TO PHQ9 QUESTIONS 1 AND 2: 0
2. FEELING DOWN, DEPRESSED OR HOPELESS: NOT AT ALL

## 2025-07-12 DIAGNOSIS — R73.9 ELEVATED BLOOD SUGAR: ICD-10-CM

## 2025-07-12 DIAGNOSIS — I10 PRIMARY HYPERTENSION: ICD-10-CM

## 2025-07-12 DIAGNOSIS — E55.9 VITAMIN D DEFICIENCY: ICD-10-CM

## 2025-07-12 DIAGNOSIS — R94.6 ABNORMAL THYROID EXAM: ICD-10-CM

## 2025-07-30 DIAGNOSIS — I10 PRIMARY HYPERTENSION: ICD-10-CM

## 2025-07-31 RX ORDER — DOXAZOSIN 2 MG/1
2 TABLET ORAL NIGHTLY
Qty: 30 TABLET | Refills: 0 | Status: SHIPPED | OUTPATIENT
Start: 2025-07-31

## 2025-08-18 ENCOUNTER — OFFICE VISIT (OUTPATIENT)
Dept: PRIMARY CARE | Facility: CLINIC | Age: 80
End: 2025-08-18
Payer: MEDICARE

## 2025-08-18 ENCOUNTER — APPOINTMENT (OUTPATIENT)
Dept: ENDOCRINOLOGY | Facility: CLINIC | Age: 80
End: 2025-08-18
Payer: MEDICARE

## 2025-08-18 VITALS
SYSTOLIC BLOOD PRESSURE: 138 MMHG | HEIGHT: 65 IN | DIASTOLIC BLOOD PRESSURE: 78 MMHG | BODY MASS INDEX: 20.66 KG/M2 | OXYGEN SATURATION: 95 % | HEART RATE: 80 BPM | WEIGHT: 124 LBS

## 2025-08-18 DIAGNOSIS — Z00.00 MEDICARE ANNUAL WELLNESS VISIT, SUBSEQUENT: Primary | ICD-10-CM

## 2025-08-18 DIAGNOSIS — I42.2 OTHER HYPERTROPHIC CARDIOMYOPATHY (MULTI): ICD-10-CM

## 2025-08-18 DIAGNOSIS — E83.52 HYPERCALCEMIA: ICD-10-CM

## 2025-08-18 DIAGNOSIS — I10 PRIMARY HYPERTENSION: ICD-10-CM

## 2025-08-18 DIAGNOSIS — M11.261 PSEUDOGOUT OF KNEE, RIGHT: ICD-10-CM

## 2025-08-18 PROBLEM — N95.8 OTHER SPECIFIED MENOPAUSAL AND PERIMENOPAUSAL DISORDERS: Status: RESOLVED | Noted: 2023-10-22 | Resolved: 2025-08-18

## 2025-08-18 PROCEDURE — 3078F DIAST BP <80 MM HG: CPT | Performed by: FAMILY MEDICINE

## 2025-08-18 PROCEDURE — 1036F TOBACCO NON-USER: CPT | Performed by: FAMILY MEDICINE

## 2025-08-18 PROCEDURE — 3075F SYST BP GE 130 - 139MM HG: CPT | Performed by: FAMILY MEDICINE

## 2025-08-18 PROCEDURE — 99397 PER PM REEVAL EST PAT 65+ YR: CPT | Performed by: FAMILY MEDICINE

## 2025-08-18 PROCEDURE — G0439 PPPS, SUBSEQ VISIT: HCPCS | Performed by: FAMILY MEDICINE

## 2025-08-18 PROCEDURE — 99214 OFFICE O/P EST MOD 30 MIN: CPT | Performed by: FAMILY MEDICINE

## 2025-08-18 PROCEDURE — 1170F FXNL STATUS ASSESSED: CPT | Performed by: FAMILY MEDICINE

## 2025-08-18 PROCEDURE — 1159F MED LIST DOCD IN RCRD: CPT | Performed by: FAMILY MEDICINE

## 2025-08-18 PROCEDURE — 1160F RVW MEDS BY RX/DR IN RCRD: CPT | Performed by: FAMILY MEDICINE

## 2025-08-18 RX ORDER — LISINOPRIL 40 MG/1
40 TABLET ORAL DAILY
Qty: 90 TABLET | Refills: 1 | Status: SHIPPED | OUTPATIENT
Start: 2025-08-18

## 2025-08-18 RX ORDER — DOXAZOSIN 2 MG/1
2 TABLET ORAL NIGHTLY
Qty: 90 TABLET | Refills: 1 | Status: SHIPPED | OUTPATIENT
Start: 2025-08-18

## 2025-08-18 ASSESSMENT — ACTIVITIES OF DAILY LIVING (ADL)
TAKING_MEDICATION: INDEPENDENT
MANAGING_FINANCES: INDEPENDENT
DOING_HOUSEWORK: INDEPENDENT
DRESSING: INDEPENDENT
BATHING: INDEPENDENT
GROCERY_SHOPPING: INDEPENDENT

## 2025-08-18 ASSESSMENT — PATIENT HEALTH QUESTIONNAIRE - PHQ9
SUM OF ALL RESPONSES TO PHQ9 QUESTIONS 1 AND 2: 0
2. FEELING DOWN, DEPRESSED OR HOPELESS: NOT AT ALL
1. LITTLE INTEREST OR PLEASURE IN DOING THINGS: NOT AT ALL

## 2025-08-18 ASSESSMENT — COLUMBIA-SUICIDE SEVERITY RATING SCALE - C-SSRS
1. IN THE PAST MONTH, HAVE YOU WISHED YOU WERE DEAD OR WISHED YOU COULD GO TO SLEEP AND NOT WAKE UP?: NO
2. HAVE YOU ACTUALLY HAD ANY THOUGHTS OF KILLING YOURSELF?: NO

## 2025-08-23 LAB
25(OH)D3+25(OH)D2 SERPL-MCNC: 56 NG/ML (ref 30–100)
ALBUMIN SERPL-MCNC: 4.2 G/DL (ref 3.6–5.1)
ALP SERPL-CCNC: 70 U/L (ref 37–153)
ALT SERPL-CCNC: 10 U/L (ref 6–29)
ANION GAP SERPL CALCULATED.4IONS-SCNC: 8 MMOL/L (CALC) (ref 7–17)
AST SERPL-CCNC: 20 U/L (ref 10–35)
BILIRUB SERPL-MCNC: 0.3 MG/DL (ref 0.2–1.2)
BUN SERPL-MCNC: 16 MG/DL (ref 7–25)
CALCIUM SERPL-MCNC: 10.6 MG/DL (ref 8.6–10.4)
CHLORIDE SERPL-SCNC: 108 MMOL/L (ref 98–110)
CHOLEST SERPL-MCNC: 213 MG/DL
CHOLEST/HDLC SERPL: 3 (CALC)
CO2 SERPL-SCNC: 30 MMOL/L (ref 20–32)
CREAT SERPL-MCNC: 0.93 MG/DL (ref 0.6–0.95)
EGFRCR SERPLBLD CKD-EPI 2021: 62 ML/MIN/1.73M2
ERYTHROCYTE [DISTWIDTH] IN BLOOD BY AUTOMATED COUNT: 12.8 % (ref 11–15)
EST. AVERAGE GLUCOSE BLD GHB EST-MCNC: 123 MG/DL
EST. AVERAGE GLUCOSE BLD GHB EST-SCNC: 6.8 MMOL/L
GLUCOSE SERPL-MCNC: 129 MG/DL (ref 65–139)
HBA1C MFR BLD: 5.9 %
HCT VFR BLD AUTO: 39.1 % (ref 35–45)
HDLC SERPL-MCNC: 70 MG/DL
HGB BLD-MCNC: 12.1 G/DL (ref 11.7–15.5)
LDLC SERPL CALC-MCNC: 124 MG/DL (CALC)
MCH RBC QN AUTO: 28.3 PG (ref 27–33)
MCHC RBC AUTO-ENTMCNC: 30.9 G/DL (ref 32–36)
MCV RBC AUTO: 91.4 FL (ref 80–100)
NONHDLC SERPL-MCNC: 143 MG/DL (CALC)
PLATELET # BLD AUTO: 325 THOUSAND/UL (ref 140–400)
PMV BLD REES-ECKER: 10 FL (ref 7.5–12.5)
POTASSIUM SERPL-SCNC: 4.3 MMOL/L (ref 3.5–5.3)
PROT SERPL-MCNC: 6.7 G/DL (ref 6.1–8.1)
RBC # BLD AUTO: 4.28 MILLION/UL (ref 3.8–5.1)
SODIUM SERPL-SCNC: 146 MMOL/L (ref 135–146)
TRIGL SERPL-MCNC: 93 MG/DL
TSH SERPL-ACNC: 1.02 MIU/L (ref 0.4–4.5)
VIT B12 SERPL-MCNC: 302 PG/ML (ref 200–1100)
WBC # BLD AUTO: 5.5 THOUSAND/UL (ref 3.8–10.8)

## 2025-09-23 ENCOUNTER — APPOINTMENT (OUTPATIENT)
Dept: OTOLARYNGOLOGY | Facility: CLINIC | Age: 80
End: 2025-09-23
Payer: MEDICARE

## 2026-02-18 ENCOUNTER — APPOINTMENT (OUTPATIENT)
Dept: PRIMARY CARE | Facility: CLINIC | Age: 81
End: 2026-02-18
Payer: MEDICARE